# Patient Record
Sex: FEMALE | Race: WHITE | NOT HISPANIC OR LATINO
[De-identification: names, ages, dates, MRNs, and addresses within clinical notes are randomized per-mention and may not be internally consistent; named-entity substitution may affect disease eponyms.]

---

## 2019-07-16 ENCOUNTER — TRANSCRIPTION ENCOUNTER (OUTPATIENT)
Age: 59
End: 2019-07-16

## 2023-07-20 ENCOUNTER — INPATIENT (INPATIENT)
Facility: HOSPITAL | Age: 63
LOS: 0 days | Discharge: HOME | End: 2023-07-21
Attending: HOSPITALIST | Admitting: HOSPITALIST
Payer: COMMERCIAL

## 2023-07-20 VITALS
TEMPERATURE: 98 F | HEART RATE: 73 BPM | SYSTOLIC BLOOD PRESSURE: 160 MMHG | RESPIRATION RATE: 15 BRPM | DIASTOLIC BLOOD PRESSURE: 128 MMHG | OXYGEN SATURATION: 100 %

## 2023-07-20 DIAGNOSIS — Z98.890 OTHER SPECIFIED POSTPROCEDURAL STATES: Chronic | ICD-10-CM

## 2023-07-20 DIAGNOSIS — Z90.89 ACQUIRED ABSENCE OF OTHER ORGANS: Chronic | ICD-10-CM

## 2023-07-20 DIAGNOSIS — E03.9 HYPOTHYROIDISM, UNSPECIFIED: ICD-10-CM

## 2023-07-20 DIAGNOSIS — I63.9 CEREBRAL INFARCTION, UNSPECIFIED: ICD-10-CM

## 2023-07-20 DIAGNOSIS — R79.89 OTHER SPECIFIED ABNORMAL FINDINGS OF BLOOD CHEMISTRY: ICD-10-CM

## 2023-07-20 DIAGNOSIS — I48.20 CHRONIC ATRIAL FIBRILLATION, UNSPECIFIED: ICD-10-CM

## 2023-07-20 DIAGNOSIS — E87.20 ACIDOSIS, UNSPECIFIED: ICD-10-CM

## 2023-07-20 DIAGNOSIS — E78.5 HYPERLIPIDEMIA, UNSPECIFIED: ICD-10-CM

## 2023-07-20 LAB
ALBUMIN SERPL ELPH-MCNC: 4.3 G/DL — SIGNIFICANT CHANGE UP (ref 3.3–5)
ALP SERPL-CCNC: 123 U/L — HIGH (ref 40–120)
ALT FLD-CCNC: 31 U/L — SIGNIFICANT CHANGE UP (ref 4–33)
ANION GAP SERPL CALC-SCNC: 15 MMOL/L — HIGH (ref 7–14)
APTT BLD: 32.2 SEC — SIGNIFICANT CHANGE UP (ref 27–36.3)
AST SERPL-CCNC: 26 U/L — SIGNIFICANT CHANGE UP (ref 4–32)
BASOPHILS # BLD AUTO: 0.06 K/UL — SIGNIFICANT CHANGE UP (ref 0–0.2)
BASOPHILS NFR BLD AUTO: 0.6 % — SIGNIFICANT CHANGE UP (ref 0–2)
BILIRUB SERPL-MCNC: 0.8 MG/DL — SIGNIFICANT CHANGE UP (ref 0.2–1.2)
BUN SERPL-MCNC: 25 MG/DL — HIGH (ref 7–23)
CALCIUM SERPL-MCNC: 9.6 MG/DL — SIGNIFICANT CHANGE UP (ref 8.4–10.5)
CHLORIDE SERPL-SCNC: 103 MMOL/L — SIGNIFICANT CHANGE UP (ref 98–107)
CO2 SERPL-SCNC: 21 MMOL/L — LOW (ref 22–31)
CREAT SERPL-MCNC: 1.29 MG/DL — SIGNIFICANT CHANGE UP (ref 0.5–1.3)
EGFR: 47 ML/MIN/1.73M2 — LOW
EOSINOPHIL # BLD AUTO: 0.13 K/UL — SIGNIFICANT CHANGE UP (ref 0–0.5)
EOSINOPHIL NFR BLD AUTO: 1.2 % — SIGNIFICANT CHANGE UP (ref 0–6)
GLUCOSE SERPL-MCNC: 131 MG/DL — HIGH (ref 70–99)
HCT VFR BLD CALC: 36.8 % — SIGNIFICANT CHANGE UP (ref 34.5–45)
HGB BLD-MCNC: 11.9 G/DL — SIGNIFICANT CHANGE UP (ref 11.5–15.5)
IANC: 7.51 K/UL — HIGH (ref 1.8–7.4)
IMM GRANULOCYTES NFR BLD AUTO: 0.4 % — SIGNIFICANT CHANGE UP (ref 0–0.9)
INR BLD: 1.06 RATIO — SIGNIFICANT CHANGE UP (ref 0.88–1.16)
LYMPHOCYTES # BLD AUTO: 2.52 K/UL — SIGNIFICANT CHANGE UP (ref 1–3.3)
LYMPHOCYTES # BLD AUTO: 23.1 % — SIGNIFICANT CHANGE UP (ref 13–44)
MAGNESIUM SERPL-MCNC: 2.1 MG/DL — SIGNIFICANT CHANGE UP (ref 1.6–2.6)
MCHC RBC-ENTMCNC: 28.4 PG — SIGNIFICANT CHANGE UP (ref 27–34)
MCHC RBC-ENTMCNC: 32.3 GM/DL — SIGNIFICANT CHANGE UP (ref 32–36)
MCV RBC AUTO: 87.8 FL — SIGNIFICANT CHANGE UP (ref 80–100)
MONOCYTES # BLD AUTO: 0.64 K/UL — SIGNIFICANT CHANGE UP (ref 0–0.9)
MONOCYTES NFR BLD AUTO: 5.9 % — SIGNIFICANT CHANGE UP (ref 2–14)
NEUTROPHILS # BLD AUTO: 7.51 K/UL — HIGH (ref 1.8–7.4)
NEUTROPHILS NFR BLD AUTO: 68.8 % — SIGNIFICANT CHANGE UP (ref 43–77)
NRBC # BLD: 0 /100 WBCS — SIGNIFICANT CHANGE UP (ref 0–0)
NRBC # FLD: 0 K/UL — SIGNIFICANT CHANGE UP (ref 0–0)
PHOSPHATE SERPL-MCNC: 3.7 MG/DL — SIGNIFICANT CHANGE UP (ref 2.5–4.5)
PLATELET # BLD AUTO: 342 K/UL — SIGNIFICANT CHANGE UP (ref 150–400)
POTASSIUM SERPL-MCNC: 4.1 MMOL/L — SIGNIFICANT CHANGE UP (ref 3.5–5.3)
POTASSIUM SERPL-SCNC: 4.1 MMOL/L — SIGNIFICANT CHANGE UP (ref 3.5–5.3)
PROT SERPL-MCNC: 7.7 G/DL — SIGNIFICANT CHANGE UP (ref 6–8.3)
PROTHROM AB SERPL-ACNC: 12.3 SEC — SIGNIFICANT CHANGE UP (ref 10.5–13.4)
RBC # BLD: 4.19 M/UL — SIGNIFICANT CHANGE UP (ref 3.8–5.2)
RBC # FLD: 13.9 % — SIGNIFICANT CHANGE UP (ref 10.3–14.5)
SODIUM SERPL-SCNC: 139 MMOL/L — SIGNIFICANT CHANGE UP (ref 135–145)
TROPONIN T, HIGH SENSITIVITY RESULT: 9 NG/L — SIGNIFICANT CHANGE UP
WBC # BLD: 10.9 K/UL — HIGH (ref 3.8–10.5)
WBC # FLD AUTO: 10.9 K/UL — HIGH (ref 3.8–10.5)

## 2023-07-20 PROCEDURE — 93306 TTE W/DOPPLER COMPLETE: CPT | Mod: 26

## 2023-07-20 PROCEDURE — 99223 1ST HOSP IP/OBS HIGH 75: CPT

## 2023-07-20 PROCEDURE — 70450 CT HEAD/BRAIN W/O DYE: CPT | Mod: 26,59,MA

## 2023-07-20 PROCEDURE — G1004: CPT

## 2023-07-20 PROCEDURE — 70496 CT ANGIOGRAPHY HEAD: CPT | Mod: 26,MG

## 2023-07-20 PROCEDURE — 70498 CT ANGIOGRAPHY NECK: CPT | Mod: 26,MG

## 2023-07-20 RX ORDER — LEVOTHYROXINE SODIUM 125 MCG
100 TABLET ORAL DAILY
Refills: 0 | Status: DISCONTINUED | OUTPATIENT
Start: 2023-07-21 | End: 2023-07-21

## 2023-07-20 RX ORDER — METOPROLOL TARTRATE 50 MG
12.5 TABLET ORAL
Refills: 0 | Status: DISCONTINUED | OUTPATIENT
Start: 2023-07-20 | End: 2023-07-21

## 2023-07-20 RX ORDER — RIVAROXABAN 15 MG-20MG
15 KIT ORAL
Refills: 0 | Status: DISCONTINUED | OUTPATIENT
Start: 2023-07-21 | End: 2023-07-21

## 2023-07-20 RX ORDER — ATORVASTATIN CALCIUM 80 MG/1
80 TABLET, FILM COATED ORAL DAILY
Refills: 0 | Status: DISCONTINUED | OUTPATIENT
Start: 2023-07-20 | End: 2023-07-20

## 2023-07-20 RX ORDER — LEVOTHYROXINE SODIUM 125 MCG
1 TABLET ORAL
Refills: 0 | DISCHARGE

## 2023-07-20 RX ADMIN — Medication 12.5 MILLIGRAM(S): at 19:17

## 2023-07-20 NOTE — H&P ADULT - PROBLEM SELECTOR PLAN 2
-Currently in SR.  -Will start low dose metoprolol 12.5 BID.  -Started on Xarelto.  -Counseled pt on standing metoprolol. Aware of her concerns w/ adverse effect, which primarily is w/ weight gain and bloating of mid body. For now - will try lower dose of BB.  -Monitor telemetry.  -Check TSH (reportedly normal a few days ago).  -Further f/u with PCP/Cardiology (Dr Breezy Wright).

## 2023-07-20 NOTE — CONSULT NOTE ADULT - SUBJECTIVE AND OBJECTIVE BOX
HPI:  63 y.o. F with PMH of paroxysmal afib (diagnosed in 2018, on metoprolol prn) and hypothyroidism presented to [facility] ED due to R sided numbness. Neurology consulted for concern for stroke and abnormal CTH finding. LKN 12:00 hr on 7/20/23. Pt had gone to 's brother's party, came back around midnight at her usual state of health. Pt made a tea then around 12:30 hr on 7/20, started to feel R forehead, temporal region of R side of head numbness, radiating down behind the ear and shoulders. At that time, took baby aspirin then presented to ED. There was no associated symptoms of visual deficit, numbness/tingling/weakness elsewhere in the body, gait instability, speech difficulty, or altered mental state. It lasted for about 1-1.5 hrs and while awaiting to come into the ED, symptom resolved. Currently denies any headache, chest pain, abdominal pain, numbness/tingling/weakness throughout extremities. For the past 10 days, patient has been having atrial fibrillation that sustained longer than her usual 18-24 hr. Took metoprolol every other day per her cardiologist, whom she visited during the episode and scheduled her for echocardiogram on this upcoming Friday. At this time, pt feels back to her baseline state. Denies any smoking, or illicit drug use or marijuana use. Very rare use of alcohol maybe once a month maybe. Pt is independent with ADLs and works at a highly stressful environment. There had been alot going on in her work for several years, but unclear trigger that might have led to the presenting symptom. Lives with her . Reported that she was initially on Eliquis in 2018 when she first diagnosed with afib. However, it made her flush so stopped taking. Instead was recommended to use aspirin whenever afib came on as symptoms were not sustained, but rare maybe once a year. So it was unusual that she had 10 day of atrial fibrillation this time. Even aspirin makes her to get bruises easily so she does not take it daily. Never taken plavix, or other anticoagulations such as lovenox, xarelto. Never had prior hx of stroke, seizure, demyleinating diseases such as MS or transverse myelitis. Never felt L sided sensation or weakness symptoms.     (Stroke only)  NIHSS: 0   MRS: 0    REVIEW OF SYSTEMS  A 10-system ROS was performed and is negative except for those items noted above and/or in the HPI.    PAST MEDICAL & SURGICAL HISTORY:  Hypothyroidism      PAF (paroxysmal atrial fibrillation)        FAMILY HISTORY:    SOCIAL HISTORY: as noted in HPI    MEDICATIONS (HOME):  Home Medications:    MEDICATIONS  (STANDING):    MEDICATIONS  (PRN):    ALLERGIES/INTOLERANCES:  Allergies  penicillin (Unknown)    Intolerances    VITALS & EXAMINATION:  Vital Signs Last 24 Hrs  T(C): 36.7 (20 Jul 2023 02:42), Max: 36.8 (20 Jul 2023 01:09)  T(F): 98 (20 Jul 2023 02:42), Max: 98.2 (20 Jul 2023 01:09)  HR: 64 (20 Jul 2023 02:42) (64 - 73)  BP: 125/63 (20 Jul 2023 02:42) (125/63 - 160/128)  BP(mean): --  RR: 17 (20 Jul 2023 02:42) (15 - 17)  SpO2: 100% (20 Jul 2023 02:42) (100% - 100%)    Parameters below as of 20 Jul 2023 02:42  Patient On (Oxygen Delivery Method): room air        General:  Constitutional: Obese Female, appears stated age, in no apparent distress including pain  Head: Normocephalic & atraumatic.  Extremities: R elbow bruises    Neurological (>12):  MS: Eyes open. Maintains eye opening to verbal stimuli. Awake, alert, oriented to person, place, situation, time. Normal affect. Follows all commands. Speech is clear, fluent with good repetition & comprehension (able to name objects socks, alcohol pad, hand)    CNs: VFF. EOMI no nystagmus, no diplopia. V1-3 intact to LT, well developed masseter muscles b/l. No facial asymmetry b/l, full eye closure strength b/l. Hearing grossly normal (rubbing fingers) b/l. Symmetric palate elevation in midline. Head turning & shoulder shrug intact b/l. Tongue midline, normal movements, no atrophy.    Motor: Normal muscle bulk & tone. No noticeable tremor. No pronator drift.              Deltoid	Biceps	Triceps	   R	5	5	5	5	  L	5	5	5	5	  	H-Flex	K-Flex	K-Ext	D-Flex	P-Flex  R	5	5	5	5	5		   L	5	5	5	5	5		     Sensation: Intact to LT b/l throughout.     Cortical: Extinction on DSS (neglect): none    Reflexes:              Biceps(C5)       BR(C6)     Triceps(C7)               Patellar(L4)    Achilles(S1)    Plantar Resp  R	2	          2	             2		        1		    2		Down   L	2	          2	             2		        1		    2		Down     Coordination: intact rapid-alt movements. No dysmetria to FTN    Gait: Deferred    LABORATORY:  CBC                       11.9   10.90 )-----------( 342      ( 20 Jul 2023 02:50 )             36.8     Chem 07-20    139  |  103  |  25<H>  ----------------------------<  131<H>  4.1   |  21<L>  |  1.29    Ca    9.6      20 Jul 2023 02:50  Phos  3.7     07-20  Mg     2.10     07-20    TPro  7.7  /  Alb  4.3  /  TBili  0.8  /  DBili  x   /  AST  26  /  ALT  31  /  AlkPhos  123<H>  07-20    LFTs LIVER FUNCTIONS - ( 20 Jul 2023 02:50 )  Alb: 4.3 g/dL / Pro: 7.7 g/dL / ALK PHOS: 123 U/L / ALT: 31 U/L / AST: 26 U/L / GGT: x           Coagulopathy PT/INR - ( 20 Jul 2023 02:50 )   PT: 12.3 sec;   INR: 1.06 ratio         PTT - ( 20 Jul 2023 02:50 )  PTT:32.2 sec  Lipid Panel   A1c   Cardiac enzymes     U/A Urinalysis Basic - ( 20 Jul 2023 02:50 )    Color: x / Appearance: x / SG: x / pH: x  Gluc: 131 mg/dL / Ketone: x  / Bili: x / Urobili: x   Blood: x / Protein: x / Nitrite: x   Leuk Esterase: x / RBC: x / WBC x   Sq Epi: x / Non Sq Epi: x / Bacteria: x      CSF  Immunological  Other    STUDIES & IMAGING:  Studies (EKG, EEG, EMG, etc):     Radiology (XR, CT, MR, U/S, TTE/JOSUE):   HPI:  63 y.o. F with PMH of paroxysmal afib (diagnosed in 2018, on metoprolol prn) and hypothyroidism presented to [facility] ED due to R sided numbness. Neurology consulted for concern for stroke and abnormal CTH finding. LKN 12:00 hr on 7/20/23. Pt had gone to 's brother's party, came back around midnight at her usual state of health. Pt made a tea then around 12:30 hr on 7/20, started to feel R forehead, temporal region of R side of head numbness, radiating down behind the ear and shoulders. At that time, took baby aspirin then presented to ED. There was no associated symptoms of visual deficit, numbness/tingling/weakness elsewhere in the body, gait instability, speech difficulty, or altered mental state. It lasted for about 1-1.5 hrs and while awaiting to come into the ED, symptom resolved. Currently denies any headache, chest pain, abdominal pain, numbness/tingling/weakness throughout extremities. For the past 10 days, patient has been having atrial fibrillation that sustained longer than her usual 18-24 hr. Took metoprolol every other day per her cardiologist, whom she visited during the episode and scheduled her for echocardiogram on this upcoming Friday. At this time, pt feels back to her baseline state. Denies any smoking, or illicit drug use or marijuana use. Very rare use of alcohol maybe once a month maybe. Pt is independent with ADLs and works at a highly stressful environment. There had been alot going on in her work for several years, but unclear trigger that might have led to the presenting symptom. Lives with her . Reported that she was initially on Eliquis in 2018 when she first diagnosed with afib. However, it made her flush so stopped taking. Instead was recommended to use aspirin whenever afib came on as symptoms were not sustained, but rare maybe once a year. So it was unusual that she had 10 day of atrial fibrillation this time. Even aspirin makes her to get bruises easily so she does not take it daily. Never taken plavix, or other anticoagulations such as lovenox, xarelto. Never had prior hx of stroke, seizure, demyleinating diseases such as MS or transverse myelitis. Never felt L sided sensation or weakness symptoms.     (Stroke only)  NIHSS: 0   MRS: 0    REVIEW OF SYSTEMS  A 10-system ROS was performed and is negative except for those items noted above and/or in the HPI.    PAST MEDICAL & SURGICAL HISTORY:  Hypothyroidism      PAF (paroxysmal atrial fibrillation)        FAMILY HISTORY:    SOCIAL HISTORY: as noted in HPI    MEDICATIONS (HOME):  Home Medications:    MEDICATIONS  (STANDING):    MEDICATIONS  (PRN):    ALLERGIES/INTOLERANCES:  Allergies  penicillin (Unknown)    Intolerances    VITALS & EXAMINATION:  Vital Signs Last 24 Hrs  T(C): 36.7 (20 Jul 2023 02:42), Max: 36.8 (20 Jul 2023 01:09)  T(F): 98 (20 Jul 2023 02:42), Max: 98.2 (20 Jul 2023 01:09)  HR: 64 (20 Jul 2023 02:42) (64 - 73)  BP: 125/63 (20 Jul 2023 02:42) (125/63 - 160/128)  BP(mean): --  RR: 17 (20 Jul 2023 02:42) (15 - 17)  SpO2: 100% (20 Jul 2023 02:42) (100% - 100%)    Parameters below as of 20 Jul 2023 02:42  Patient On (Oxygen Delivery Method): room air        General:  Constitutional: Obese Female, appears stated age, in no apparent distress including pain  Head: Normocephalic & atraumatic.  Extremities: R elbow bruises    Neurological (>12):  MS: Eyes open. Maintains eye opening to verbal stimuli. Awake, alert, oriented to person, place, situation, time. Normal affect. Follows all commands. Speech is clear, fluent with good repetition & comprehension (able to name objects socks, alcohol pad, hand)    CNs: VFF. EOMI no nystagmus, no diplopia. V1-3 intact to LT, well developed masseter muscles b/l. No facial asymmetry b/l, full eye closure strength b/l. Hearing grossly normal (rubbing fingers) b/l. Symmetric palate elevation in midline. Head turning & shoulder shrug intact b/l. Tongue midline, normal movements, no atrophy.    Motor: Normal muscle bulk & tone. No noticeable tremor. No pronator drift.              Deltoid	Biceps	Triceps	   R	5	5	5	5	  L	5	5	5	5	  	H-Flex	K-Flex	K-Ext	D-Flex	P-Flex  R	5	5	5	5	5		   L	5	5	5	5	5		     Sensation: Intact to LT b/l throughout.     Cortical: Extinction on DSS (neglect): none    Reflexes:              Biceps(C5)       BR(C6)     Triceps(C7)               Patellar(L4)    Achilles(S1)    Plantar Resp  R	2	          2	             2		        1		    2		Down   L	2	          2	             2		        1		    2		Down     Coordination: intact rapid-alt movements. No dysmetria to FTN    Gait: Deferred    LABORATORY:  CBC                       11.9   10.90 )-----------( 342      ( 20 Jul 2023 02:50 )             36.8     Chem 07-20    139  |  103  |  25<H>  ----------------------------<  131<H>  4.1   |  21<L>  |  1.29    Ca    9.6      20 Jul 2023 02:50  Phos  3.7     07-20  Mg     2.10     07-20    TPro  7.7  /  Alb  4.3  /  TBili  0.8  /  DBili  x   /  AST  26  /  ALT  31  /  AlkPhos  123<H>  07-20    LFTs LIVER FUNCTIONS - ( 20 Jul 2023 02:50 )  Alb: 4.3 g/dL / Pro: 7.7 g/dL / ALK PHOS: 123 U/L / ALT: 31 U/L / AST: 26 U/L / GGT: x           Coagulopathy PT/INR - ( 20 Jul 2023 02:50 )   PT: 12.3 sec;   INR: 1.06 ratio         PTT - ( 20 Jul 2023 02:50 )  PTT:32.2 sec  Lipid Panel   A1c   Cardiac enzymes     U/A Urinalysis Basic - ( 20 Jul 2023 02:50 )    Color: x / Appearance: x / SG: x / pH: x  Gluc: 131 mg/dL / Ketone: x  / Bili: x / Urobili: x   Blood: x / Protein: x / Nitrite: x   Leuk Esterase: x / RBC: x / WBC x   Sq Epi: x / Non Sq Epi: x / Bacteria: x      CSF  Immunological  Other    STUDIES & IMAGING:  Studies (EKG, EEG, EMG, etc):     Radiology (XR, CT, MR, U/S, TTE/JOSUE):  < from: CT Head No Cont (07.20.23 @ 03:08) >  IMPRESSION:    Findings concerning for acute/subacute infarct within the right frontal   lobe, with subtle area of relative cortical hyperattenuation which may   represent petechial hemorrhage. MRI brain recommended for further   characterization.    No mass effect.    < end of copied text >

## 2023-07-20 NOTE — ED CDU PROVIDER INITIAL DAY NOTE - ATTENDING APP SHARED VISIT CONTRIBUTION OF CARE
The decision was made to admit this patient to the CDU as documented in my Provider note I have reviewed the note  written by the CDU Physician Assistant, on that visit day. I have supervised and participated as necessary in the performance of procedures indicated for patient management and was available at all phases of the patient´s visit when needed.    Please see the  provider note for the details of the decision to admit  Vital Signs Last 24 Hrs  T(F): 97.9 HR: 65: 131/62 RR: 17 SpO2: 96% (20 Jul 2023 06:49) (96% - 100%)    PE unchanged at time of admission  Patient admitted for CT that showed acute/subacute infarct within the right frontal   lobe, with subtle area of relative cortical hyperattenuation which may   represent petechial hemorrhage in patient who is non compliant with AC  Pt to be seen by neuro;   CTA head and neck ordered en lieu of neuro to see to d/w  MRI further workup ECHO performed   to reassess

## 2023-07-20 NOTE — OCCUPATIONAL THERAPY INITIAL EVALUATION ADULT - PERTINENT HX OF CURRENT PROBLEM, REHAB EVAL
63 year old female presenting with right forehead and temporal region head numbness, radiating down behind the ear and shoulders. Symptoms lasted for about 1-1.5 hrs and while awaiting to come into the ED, symptom resolved. CT head significant for acute/subacute infarct within the right frontal lobe, with subtle area of relative cortical hyperattenuation which may represent petechial hemorrhage.

## 2023-07-20 NOTE — PATIENT PROFILE ADULT - FALL HARM RISK - HARM RISK INTERVENTIONS

## 2023-07-20 NOTE — ED ADULT NURSE NOTE - NSFALLUNIVINTERV_ED_ALL_ED
Bed/Stretcher in lowest position, wheels locked, appropriate side rails in place/Call bell, personal items and telephone in reach/Instruct patient to call for assistance before getting out of bed/chair/stretcher/Non-slip footwear applied when patient is off stretcher/[place] to call system/Physically safe environment - no spills, clutter or unnecessary equipment/Purposeful proactive rounding/Room/bathroom lighting operational, light cord in reach

## 2023-07-20 NOTE — ED ADULT NURSE REASSESSMENT NOTE - GENERAL PATIENT STATE
Pt is axo4, calm pleasant affect presently denies any Neuro deficits as per report Pt reported rt sided facial numbness/extending to rt shoulder. hx of afib. Pt presently NSR. vs and neuro check q 4 hours see flow sheet charting./comfortable appearance/family/SO at bedside

## 2023-07-20 NOTE — CONSULT NOTE ADULT - ATTENDING COMMENTS
seen 7/20 AM  Karine   63 y.o. obese  F with known paroxysmal afib (diagnosed in 2018, on metoprolol prn; previoulsy on eliquis but said she had flushing) and hypothyroidism presented to [facility] ED due to R sided numbness. Neurology consulted for concern for stroke and abnormal CTH finding. LKN 12:00 hr on 7/20/23. Had 1-1.5 hrs of R V1 region, R temporal region, and R shoulder numbness that resolved at this time. Had been seeing cardiologist recently due to 10 day of sustained afib, which reverted back to sinus with metoprolol use. Had adverse reaction of flushing with eliquis back in 2018, so does not take eliquis. Advised to take aspirin when needed because her afib usually lasts only 18-24 hrs, once a year. Aspirin also makes her bruise easily. Neuro exam is non-focal.   CTH showing acute/subacute infarct in R frontal lobe, with subtle area os hyperattenuation which may represent petechial hemorrhage.   NIHSS: 0   MRS: 0  A1c 117  LDL 5.4  TTE done 7/20 f/u read     Impression: R NORMAN stroke likely CE from AF     Recommendations:   - admit for further workup  - cardio eval for AF   - Start DOAC.  prefer eliquis 5mg BID but since had AE can consider xarelto or pradaxa.   - would start DOAC now.  repeat CTH in 24hrs to moniotr for hemorrhage.   - MRI brain, refusing   - High dose statin therapy - atorvastatin 80mg PO daily. LDL goal <70mg/dL.  - CTA H/N f/u read   - f/u TTE   - telemetry  - BP normotesnive   - check FS, glucose control <180  - GI/DVT ppx  - Counseling on diet, exercise, and medication adherence was done  - Counseling on smoking cessation and alcohol consumption offered when appropriate.  - Pain assessed and judicious use of narcotics when appropriate was discussed.    - Stroke education given when appropriate.  - Importance of fall prevention discussed.   - Differential diagnosis and plan of care discussed with patient and/or family and primary team  - Thank you for allowing me to participate in the care of this patient. Call with questions.    - Upon discharge, PT should F/U with Dr. Catalan: [phone] [address] [place] [address]     Dalton Catalan MD  Vascular Neurology  Office: [phone]

## 2023-07-20 NOTE — ED CDU PROVIDER INITIAL DAY NOTE - CLINICAL SUMMARY MEDICAL DECISION MAKING FREE TEXT BOX
63-year-old female past medical history of paroxysmal A-fib and hypothyroidism presenting with right-sided numbness. HD stable. Labs reviewed. CT head shows "Findings concerning for acute/subacute infarct within the right frontal lobe, with subtle area of relative cortical hyperattenuation which may represent petechial hemorrhage. MRI brain recommended for further characterization." Pt is refusing MRI brain. Seen by Neuro, recommend admission for further work up, and consideration of anticoagulation.

## 2023-07-20 NOTE — CONSULT NOTE ADULT - ASSESSMENT
63 y.o. F with PMH of paroxysmal afib (diagnosed in 2018, on metoprolol prn) and hypothyroidism presented to [facility] ED due to R sided numbness. Neurology consulted for concern for stroke and abnormal CTH finding. LKN 12:00 hr on 7/20/23. Had 1-1.5 hrs of R V1 region, R temporal region, and R shoulder numbness that resolved at this time. Had been seeing cardiologist recently due to 10 day of sustained afib, which reverted back to sinus with metoprolol use. Had adverse reaction of flushing with eliquis back in 2018, so does not take eliquis. Advised to take aspirin when needed because her afib usually lasts only 18-24 hrs, once a year. Aspirin also makes her bruise easily. Neuro exam is non-focal. CTH awaiting final read but could be subacute to chronic R frontal infarct, NORMAN territory, which has unclear clinical correlation with presenting symptom.     Impression: R v1, R temporal region, and R shoulder decreased sensation of unclear etiology. Localization could be L hemispheric dysfunction. Ddx include acute ischemic stroke (especially given no anticoagulation use, scarce aspirin use, and recent sustained afib for 10 days) vs TIA vs r/o toxic metabolic or infectious. There is subacute to chronic R NORMAN infarct on CTH done on 7/20, for which mechanism is unclear and considering cardioembolism    Recommendations:  [] Await CTH result  [] Advised patient to stay at least overnight in the hospital for further stroke work up with MRI brain w/o and TTE with bubble. However, at this time, patient does not want to risk of having abnormal heart rhythm with closed MRI in the hospital as it will make her nervous. Pt declined the inpatient or CDU evaluation at this time. If pt changes mind about getting work up, can consider CDU for MRI brain w/o and MRA H/N  [] Ideally, patient should be taking Eliquis as secondary prevention of stroke given history of eliquis. However, due to adverse effects of flushing in the past and "harsh on body" pt is not taking at this time. Recommended for dual antiplatelet at least with aspirin and plavix however aspirin makes her bruise easily. Unsure if she will take both, but in the end pt agreed to take aspirin daily at least until expedited neurology follow up  [] Explained risks and benefit of leaving the hospital without stroke work up given prior infarct on imaging and what happened today. Pt still declined in hospital work up and wishes to get open MRI. Advised patient to return to the emergency room in case for returning arrhythmia and/or any one sided deficit with sensation, weakness, balance, or visual deficits. Pt voiced understanding  [] Atorvastatin 80, titrate to LDL<70  [] A1c and Lipid profile  [] Telemonitoring;  Neurochecks and Vital signs q4 if patient changes mind  [] Permissive HTN up to 220/120 mmHg for first 24 hours after the symptom onset followed by gradual normotension.   [] BGM goals 140-180  [] PT/OT   [] NPO until clears Dysphagia screen, otherwise Swallow evaluation  [] Stroke education provided  [] Upon discharge, PT should F/U with Dr. Catalan: [phone] [address] [place] [address]     Case to be seen and discussed with stroke attending in AM if remains in hospital 63 y.o. F with PMH of paroxysmal afib (diagnosed in 2018, on metoprolol prn) and hypothyroidism presented to [facility] ED due to R sided numbness. Neurology consulted for concern for stroke and abnormal CTH finding. LKN 12:00 hr on 7/20/23. Had 1-1.5 hrs of R V1 region, R temporal region, and R shoulder numbness that resolved at this time. Had been seeing cardiologist recently due to 10 day of sustained afib, which reverted back to sinus with metoprolol use. Had adverse reaction of flushing with eliquis back in 2018, so does not take eliquis. Advised to take aspirin when needed because her afib usually lasts only 18-24 hrs, once a year. Aspirin also makes her bruise easily. Neuro exam is non-focal. CTH showing acute/subacute infarct in R frontal lobe, with subtle area os hyperattenuation which may represent petechial hemorrhage.     Impression: R v1, R temporal region, and R shoulder decreased sensation of unclear etiology. Localization could be L hemispheric dysfunction. Ddx include acute ischemic stroke (especially given no anticoagulation use, scarce aspirin use, and recent sustained afib for 10 days) vs TIA vs r/o toxic metabolic or infectious. There is subacute to chronic R NORMAN infarct on CTH done on 7/20, for which mechanism is unclear and considering cardioembolism    Recommendations:  [] Advised patient to stay at least overnight in the hospital for further stroke work up with MRI brain w/o and TTE with bubble. However, at this time, patient does not want to risk of having abnormal heart rhythm with closed MRI in the hospital as it will make her nervous. Pt declined the inpatient or CDU evaluation at this time. If pt changes mind about getting work up, can consider CDU for MRI brain w/o and MRA H/N  [] Ideally, patient should be taking Eliquis as secondary prevention of stroke given history of eliquis. However, due to adverse effects of flushing in the past and "harsh on body" pt is not taking at this time. Recommended for dual antiplatelet at least with aspirin and plavix however aspirin makes her bruise easily. Unsure if she will take both, but in the end pt agreed to take aspirin daily at least until expedited neurology follow up  [] Explained risks and benefit of leaving the hospital without stroke work up given prior infarct on imaging and what happened today. Pt still declined in hospital work up and wishes to get open MRI. Advised patient to return to the emergency room in case for returning arrhythmia and/or any one sided deficit with sensation, weakness, balance, or visual deficits. Pt voiced understanding  [] Atorvastatin 80, titrate to LDL<70  [] A1c and Lipid profile  [] Telemonitoring;  Neurochecks and Vital signs q4 if patient changes mind  [] Permissive HTN up to 220/120 mmHg for first 24 hours after the symptom onset followed by gradual normotension.   [] BGM goals 140-180  [] PT/OT   [] NPO until clears Dysphagia screen, otherwise Swallow evaluation  [] Stroke education provided  [] Upon discharge, PT should F/U with Dr. Catalan: [phone] [address] [facility] [place] [place] [address]     Case to be seen and discussed with stroke attending in AM if remains in hospital 63 y.o. F with PMH of paroxysmal afib (diagnosed in 2018, on metoprolol prn) and hypothyroidism presented to [facility] ED due to R sided numbness. Neurology consulted for concern for stroke and abnormal CTH finding. LKN 12:00 hr on 7/20/23. Had 1-1.5 hrs of R V1 region, R temporal region, and R shoulder numbness that resolved at this time. Had been seeing cardiologist recently due to 10 day of sustained afib, which reverted back to sinus with metoprolol use. Had adverse reaction of flushing with eliquis back in 2018, so does not take eliquis. Advised to take aspirin when needed because her afib usually lasts only 18-24 hrs, once a year. Aspirin also makes her bruise easily. Neuro exam is non-focal. CTH showing acute/subacute infarct in R frontal lobe, with subtle area os hyperattenuation which may represent petechial hemorrhage.     Impression: R v1, R temporal region, and R shoulder decreased sensation of unclear etiology. Localization could be L hemispheric dysfunction. Ddx include acute ischemic stroke (especially given no anticoagulation use, scarce aspirin use, and recent sustained afib for 10 days) vs TIA vs r/o toxic metabolic or infectious. There is acute to subacute R NORMAN infarct on CTH done on 7/20, for which mechanism is unclear and considering cardioembolism    Recommendations:  [] CT scan result was communicated to the patient. She is now agreeable to stay in CDU at least for echocardiogram. Still resistant to close MR due to concern for re-developing afib due to stress and anxiety in the MR machine. If she changes her mind and wishes to pursue work up in CDU, can do MRI brain w/o and MR H/N wo/w. Otherwise would advise at least CTA H/N w/ IV contrast   [] Ideally, patient should be taking Eliquis as secondary prevention of stroke given history of eliquis. However, due to adverse effects of flushing in the past and "harsh on body" pt is not taking at this time. Given petechial hemorrhage, will confirm with stroke attending in AM for final recommendations for antiplatelet or anticoagulation management  [] Atorvastatin 80, titrate to LDL<70  [] A1c and Lipid profile  [] Telemonitoring;  Neurochecks and Vital signs q4 if patient changes mind  [] Permissive HTN up to 220/120 mmHg for first 24 hours after the symptom onset followed by gradual normotension.   [] BGM goals 140-180  [] PT/OT   [] NPO until clears Dysphagia screen, otherwise Swallow evaluation  [] Stroke education provided  [] Upon discharge, PT should F/U with Dr. Catalan: [phone] [address] [place] [address]     Case to be seen and discussed with stroke attending in AM 63 y.o. F with PMH of paroxysmal afib (diagnosed in 2018, on metoprolol prn) and hypothyroidism presented to [facility] ED due to R sided numbness. Neurology consulted for concern for stroke and abnormal CTH finding. LKN 12:00 hr on 7/20/23. Had 1-1.5 hrs of R V1 region, R temporal region, and R shoulder numbness that resolved at this time. Had been seeing cardiologist recently due to 10 day of sustained afib, which reverted back to sinus with metoprolol use. Had adverse reaction of flushing with eliquis back in 2018, so does not take eliquis. Advised to take aspirin when needed because her afib usually lasts only 18-24 hrs, once a year. Aspirin also makes her bruise easily. Neuro exam is non-focal. CTH showing acute/subacute infarct in R frontal lobe, with subtle area os hyperattenuation which may represent petechial hemorrhage.     Impression: R v1, R temporal region, and R shoulder decreased sensation of unclear etiology. Localization could be L hemispheric dysfunction. Ddx include acute ischemic stroke (especially given no anticoagulation use, scarce aspirin use, and recent sustained afib for 10 days) vs TIA vs r/o toxic metabolic or infectious. There is acute to subacute R NORMAN infarct on CTH done on 7/20, for which mechanism is unclear and considering cardioembolism    Recommendations:  [] CT scan result was communicated to the patient. She is now agreeable to stay in CDU at least for echocardiogram. Still resistant to close MR due to concern for re-developing afib due to stress and anxiety in the MR machine. If she changes her mind and wishes to pursue work up in CDU, can do MRI brain w/o and MR H/N wo/w. Otherwise would advise at least CTA H/N w/ IV contrast   [] TTE with bubble  [] Ideally, patient should be taking Eliquis as secondary prevention of stroke given history of eliquis. However, due to adverse effects of flushing in the past and "harsh on body" pt is not taking at this time. Given petechial hemorrhage, will confirm with stroke attending in AM for final recommendations for antiplatelet or anticoagulation management  [] Atorvastatin 80, titrate to LDL<70  [] A1c and Lipid profile  [] Telemonitoring;  Neurochecks and Vital signs q4 if patient changes mind  [] Permissive HTN up to 220/120 mmHg for first 24 hours after the symptom onset followed by gradual normotension.   [] BGM goals 140-180  [] PT/OT   [] NPO until clears Dysphagia screen, otherwise Swallow evaluation  [] Stroke education provided  [] Upon discharge, PT should F/U with Dr. Catalan: [phone] [address] [facility] [address] [place] [address]     Case to be seen and discussed with stroke attending in AM

## 2023-07-20 NOTE — H&P ADULT - NSHPPHYSICALEXAM_GEN_ALL_CORE
Vital Signs Last 24 Hrs  T(C): 36.9 (20 Jul 2023 10:45), Max: 36.9 (20 Jul 2023 10:45)  T(F): 98.4 (20 Jul 2023 10:45), Max: 98.4 (20 Jul 2023 10:45)  HR: 65 (20 Jul 2023 10:45) (64 - 73)  BP: 126/60 (20 Jul 2023 10:45) (125/63 - 160/128)  BP(mean): --  RR: 16 (20 Jul 2023 10:45) (15 - 18)  SpO2: 96% (20 Jul 2023 10:45) (96% - 100%)    Parameters below as of 20 Jul 2023 10:45  Patient On (Oxygen Delivery Method): room air      Gen- In bed, comfortable, NAD  Eyes- EOMI, PERRLA, nonicteric.  EMNT- Fair dentition. MMM. No tonsilar exudates. No posterior pharynx erythema.  Neck- Supple. No masses. No tracheal deviation.  Resp- CTAB, good effort. No r/r/w. No accessory muscle use.  CVS- RRR, S1S2, no g/r/m. No LE edema.  GI- Soft obese abd, NT, ND, +BSx4. No HSM.  MSK- No C/C. ROM intact. No crepitus.  Neuro- CN II-XII intact. Speech fluent/face symmetric. Sensation intact.  Skin- No rashes/ulcers. Warm.  Psych- AAOx3. Appropriate mood/affect.

## 2023-07-20 NOTE — PHYSICAL THERAPY INITIAL EVALUATION ADULT - NSPTDISCHREC_GEN_A_CORE
to address impairments in dynamic balance. Pt to be removed from PT program as patient independent./Outpatient PT

## 2023-07-20 NOTE — ED ADULT TRIAGE NOTE - TEMPERATURE IN CELSIUS (DEGREES C)
Instructed on Lexiscan Stress Test Procedure including possible side effects/ adverse reactions. Patient verbalizes  understanding and denies having any questions . See [address] [place] [address] Cardiology 36.8

## 2023-07-20 NOTE — H&P ADULT - PROBLEM SELECTOR PLAN 1
-Neuro deficits noted on admission. Imaging revealed stroke.  -NIHSS 0  -ECHO w/ bubble unremarkable. NO PFO.  -CTA H/N - no acute findings. CTH w/ "acute/subacute infarct within the right frontal lobe, with subtle area of relative cortical hyperattenuation which may represent petechial hemorrhage."  -Neuro recs appreciated-->  -MRI refused. Neuro rec repeat CTH in 24h to monitor for hemorrhage.  -Ok to start DOAC -> will try Xarelto.  -Normotension ok.  -PT/OT eval. Dysphagia screen.   -Lipid panel elevated. A1c 5.4  -Telemetry monitoring.

## 2023-07-20 NOTE — ED PROVIDER NOTE - ATTENDING CONTRIBUTION TO CARE
I performed a face-to-face evaluation of the patient and performed a history and physical examination along with the resident or ACP, and/or medical student above.  I agree with the history and physical examination as documented by the resident or ACP, and/or medical student above.  Ayoub:  CONSTITUTIONAL: Well appearing, awake, alert, oriented to person, place, time/situation and in no apparent distress.  HEAD: Atraumatic  EYES: Clear bilaterally, pupils equal, round and reactive to light.  ENMT: Airway patent, Nasal mucosa clear. Mouth with normal mucosa. Uvula is midline.   CARDIAC: Normal rate, regular rhythm. +S1/S2. No murmurs, rubs or gallops.  RESPIRATORY: Breathing unlabored. Breath sounds clear and equal bilaterally.  ABDOMEN: Soft, nontender, nondistended. No rebound tenderness or guarding.  NEUROLOGICAL: Alert and oriented, no focal deficits, no motor or sensory deficits. CN2-12 intact. Sensation intact x4 extremities.  SKIN: Skin warm and dry. No evidence of rashes or lesions.

## 2023-07-20 NOTE — OCCUPATIONAL THERAPY INITIAL EVALUATION ADULT - DIAGNOSIS, OT EVAL
s/p right sided numbness, s/p right frontal lobe infarct; decreased functional mobility, decreased ADL performance

## 2023-07-20 NOTE — ED CDU PROVIDER DISPOSITION NOTE - ATTENDING CONTRIBUTION TO CARE
I have made the decision to admit this patient to the CDU as documented in the Provider note I have reviewed the note  written by the CDU Physician Assistant, on that visit day. I have supervised and participated as necessary in the performance of procedures indicated for patient management and was available at all phases of the patient´s visit when needed.    Patient seen by neurology recommending admission for further workup

## 2023-07-20 NOTE — ED ADULT NURSE NOTE - CHIEF COMPLAINT QUOTE
presents C/O numbness to right side of face. x20 min. now resolved. No facial droop or slurred speech noted. Pt has equal strength, motor, and sensation to bilateral upper and lower extremities. No drifts noted to bilateral upper and lower extremities phx Afib hypothyroid.

## 2023-07-20 NOTE — ED PROVIDER NOTE - CLINICAL SUMMARY MEDICAL DECISION MAKING FREE TEXT BOX
63-year-old female past medical history of paroxysmal A-fib and hypothyroidism presenting with right-sided numbness- began 1.5h pta and resolved shortly after arrival. VS not actionable. Exam without FND, with normal regular heart sounds. plan to obtain ekg cbc cmp trop coags

## 2023-07-20 NOTE — PHYSICAL THERAPY INITIAL EVALUATION ADULT - PERTINENT HX OF CURRENT PROBLEM, REHAB EVAL
Pt is a 63 year old female presenting with R forehead and temporal region head numbness, radiating down behind the ear and shoulders. Symptoms lasted for about 1-1.5 hrs and while awaiting to come into the ED, symptom resolved. CT head significant for acute/subacute infarct within the right frontal lobe, with subtle area of relative cortical hyperattenuation which may represent petechial hemorrhage.

## 2023-07-20 NOTE — ED ADULT TRIAGE NOTE - CHIEF COMPLAINT QUOTE
presents C/O numbness to right side of face. x20 min. No facial droop or slurred speech noted. Pt has equal strength, motor, and sensation to bilateral upper and lower extremities. No drifts noted to bilateral upper and lower extremities phx Afib hypothyroid. presents C/O numbness to right side of face. x20 min. now resolved. No facial droop or slurred speech noted. Pt has equal strength, motor, and sensation to bilateral upper and lower extremities. No drifts noted to bilateral upper and lower extremities phx Afib hypothyroid.

## 2023-07-20 NOTE — H&P ADULT - NSHPLABSRESULTS_GEN_ALL_CORE
11.9   10.90 )-----------( 342      ( 20 Jul 2023 02:50 )             36.8     07-20    139  |  103  |  25<H>  ----------------------------<  131<H>  4.1   |  21<L>  |  1.29    Ca    9.6      20 Jul 2023 02:50  Phos  3.7     07-20  Mg     2.10     07-20    TPro  7.7  /  Alb  4.3  /  TBili  0.8  /  DBili  x   /  AST  26  /  ALT  31  /  AlkPhos  123<H>  07-20    PT/INR - ( 20 Jul 2023 02:50 )   PT: 12.3 sec;   INR: 1.06 ratio         PTT - ( 20 Jul 2023 02:50 )  PTT:32.2 sec  Urinalysis Basic - ( 20 Jul 2023 02:50 )    Color: x / Appearance: x / SG: x / pH: x  Gluc: 131 mg/dL / Ketone: x  / Bili: x / Urobili: x   Blood: x / Protein: x / Nitrite: x   Leuk Esterase: x / RBC: x / WBC x   Sq Epi: x / Non Sq Epi: x / Bacteria: x        < from: CT Angio Head w/ IV Cont (07.20.23 @ 11:19) >    IMPRESSION: Abnormal area of high attenuation seen involving the medial   right frontal region as described above    CTA of the neck and [place] Medrano appears unremarkable    < end of copied text >    < from: CT Angio Neck w/ IV Cont (07.20.23 @ 11:18) >    IMPRESSION: Abnormal area of high attenuation seen involving the medial   right frontal region as described above    CTA of the neck and [place] Medrano appears unremarkable      < end of copied text >    < from: CT Head No Cont (07.20.23 @ 03:08) >    IMPRESSION:    Findings concerning for acute/subacute infarct within the right frontal   lobe, with subtle area of relative cortical hyperattenuation which may   represent petechial hemorrhage. MRI brain recommended for further   characterization.    No mass effect.    These findings were discussed with, and acknowledged by, Dr. Orodnez at    7/20/2023 5:20 AM  by Dr. Silke Grullon.    < end of copied text >    EKG: Personally reviewed/interpreted - SR 72. TWI leads V2, V3. QTc 457.

## 2023-07-20 NOTE — H&P ADULT - PROBLEM SELECTOR PLAN 6
Elevated lipids.  -Rec starting statin as part of standard of care. Pt declining. States she has been eating healthy and walking more - has not seen any beneficial response in terms of weight loss and hyperlipidemia.  -As she continues to decline statin - order DCed.  -DASH diet after passing dysphagia screen.  -Nutrition c/s.      DVT ppx- Xarelto

## 2023-07-20 NOTE — ED ADULT NURSE NOTE - OBJECTIVE STATEMENT
Pt presents to 27 AxOx4 amb self presenting with R sided numbness. Pt states around 12:30AM/1AM she began feeling R sided facial and neck numbness. Explains she took an aspirin and symptoms have since began to resolve. NSR on cardiac monitor, states she has a hx of AFib intermittently on metoprolol for management, became concerned when she felt the facial tingling. Family at bedside. No facial droop, slurred speech noted. Strength bilaterally equal, no drifts noted. PERRLA. Denies Hx of strokes or MI. No acute distress noted at this time. Respirations even and unlabored. Denies chest pain, SOB, N/V/D, recent fevers. Pt presents to 27 AxOx4 amb self presenting with R sided numbness. Pt states around 12:30AM/1AM she began feeling R sided facial and neck numbness. Explains she took an aspirin and symptoms have since began to resolve. NSR on cardiac monitor, states she has a hx of AFib intermittently on metoprolol for management, became concerned when she felt the facial tingling. Family at bedside. No facial droop or slurred speech noted. Strength bilaterally equal, no drifts noted. PERRLA. Denies Hx of strokes or MI. No acute distress noted at this time. Respirations even and unlabored. Denies chest pain, SOB, N/V/D, recent fevers.

## 2023-07-20 NOTE — H&P ADULT - HISTORY OF PRESENT ILLNESS
63F with PMH of hypothyroidism, chronic atrial fibrillation not on AC who presents to the hospital with right facial/shoulder numbness. Sx started overnight around 1230AM. Given progression of sx - she came to the hospital for evaluation. Hx is notable for AF that is  managed w/ PRN metoprolol. Per pt - she did not tolerate metoprolol (fatigue/weight gain), so she does not take it on a standing basis. Pt reports last AF episode 2 weeks ago that lasted longer than her usual episodes. She was seen by cardiologist at the time and had been on metoprolol. Pt reportedly converted out of AFib two days ago. Pt reports having violent sensation in her chest during episodes. Currently, she denied SOB/CP/palps. Her numbness has resolved.     ED Course: 160/128, 73, 15, 98.2F, 100% RA. Refused atorvastatin. No other meds given.

## 2023-07-20 NOTE — H&P ADULT - ASSESSMENT
63F with PMH of chronic atrial fibrillation not on AC, hypothyroidism presenting with right sided facial numbness. Imaging revealed acute/subacute ischemic stroke, likely cardioembolic in origin. Admitted for further neurologic workup.

## 2023-07-20 NOTE — ED PROVIDER NOTE - PHYSICAL EXAMINATION
PHYSICAL EXAM:  CONSTITUTIONAL: Well appearing, awake, alert, oriented to person, place, time/situation and in no apparent distress.  HEAD: Atraumatic  EYES: Clear bilaterally, pupils equal, round and reactive to light.  ENMT: Airway patent, Nasal mucosa clear. Mouth with normal mucosa. Uvula is midline.   CARDIAC: Normal rate, regular rhythm. +S1/S2. No murmurs, rubs or gallops.  RESPIRATORY: Breathing unlabored. Breath sounds clear and equal bilaterally.  ABDOMEN: Soft, nontender, nondistended. No rebound tenderness or guarding.  NEUROLOGICAL: Alert and oriented, no focal deficits, no motor or sensory deficits. CN2-12 intact. Sensation intact x4 extremities.  SKIN: Skin warm and dry. No evidence of rashes or lesions.

## 2023-07-20 NOTE — ED CDU PROVIDER INITIAL DAY NOTE - OBJECTIVE STATEMENT
Initial ED provider note: "63-year-old female past medical history of paroxysmal A-fib and hypothyroidism presenting with right-sided numbness.  Patient states that  for the past 10 days she had been taking oral metoprolol for an episode of atrial fibrillation.  Patient states that this afternoon she noticed that her palpitations resolved, this evening around 399994:30 AM patient noticed sudden onset numbness extending from her right mid scalp down to her right shoulder.  Patient immediately took aspirin and presented to the emergency department as she is concerned for stroke.  Patient states that the numbness resolved shortly after arrival to the department, is no longer having the symptoms.  Denies fever neck pain weakness numbness cough shortness of breath chest pain."    CDU note: Agree with above. Initial ED provider note: "63-year-old female past medical history of paroxysmal A-fib and hypothyroidism presenting with right-sided numbness.  Patient states that  for the past 10 days she had been taking oral metoprolol for an episode of atrial fibrillation.  Patient states that this afternoon she noticed that her palpitations resolved, this evening around [id number]:30 AM patient noticed sudden onset numbness extending from her right mid scalp down to her right shoulder.  Patient immediately took aspirin and presented to the emergency department as she is concerned for stroke.  Patient states that the numbness resolved shortly after arrival to the department, is no longer having the symptoms.  Denies fever neck pain weakness numbness cough shortness of breath chest pain."    CDU note: Agree with above. 63-year-old female past medical history of paroxysmal A-fib and hypothyroidism presenting with right-sided numbness. HD stable. Labs reviewed. CT head shows "Findings concerning for acute/subacute infarct within the right frontal lobe, with subtle area of relative cortical hyperattenuation which may represent petechial hemorrhage. MRI brain recommended for further characterization." Pt is refusing MRI brain. Seen by Neuro, recommend admission for further work up, and consideration of anticoagulation.

## 2023-07-21 ENCOUNTER — TRANSCRIPTION ENCOUNTER (OUTPATIENT)
Age: 63
End: 2023-07-21

## 2023-07-21 VITALS — WEIGHT: 228.84 LBS

## 2023-07-21 LAB
ANION GAP SERPL CALC-SCNC: 11 MMOL/L — SIGNIFICANT CHANGE UP (ref 7–14)
BASOPHILS # BLD AUTO: 0.05 K/UL — SIGNIFICANT CHANGE UP (ref 0–0.2)
BASOPHILS NFR BLD AUTO: 0.6 % — SIGNIFICANT CHANGE UP (ref 0–2)
BUN SERPL-MCNC: 17 MG/DL — SIGNIFICANT CHANGE UP (ref 7–23)
CALCIUM SERPL-MCNC: 8.5 MG/DL — SIGNIFICANT CHANGE UP (ref 8.4–10.5)
CHLORIDE SERPL-SCNC: 107 MMOL/L — SIGNIFICANT CHANGE UP (ref 98–107)
CHOLEST SERPL-MCNC: 193 MG/DL — SIGNIFICANT CHANGE UP
CO2 SERPL-SCNC: 22 MMOL/L — SIGNIFICANT CHANGE UP (ref 22–31)
CREAT SERPL-MCNC: 0.99 MG/DL — SIGNIFICANT CHANGE UP (ref 0.5–1.3)
EGFR: 64 ML/MIN/1.73M2 — SIGNIFICANT CHANGE UP
EOSINOPHIL # BLD AUTO: 0.19 K/UL — SIGNIFICANT CHANGE UP (ref 0–0.5)
EOSINOPHIL NFR BLD AUTO: 2.5 % — SIGNIFICANT CHANGE UP (ref 0–6)
GLUCOSE SERPL-MCNC: 92 MG/DL — SIGNIFICANT CHANGE UP (ref 70–99)
HCT VFR BLD CALC: 32.2 % — LOW (ref 34.5–45)
HCV AB S/CO SERPL IA: 0.08 S/CO — SIGNIFICANT CHANGE UP (ref 0–0.99)
HCV AB SERPL-IMP: SIGNIFICANT CHANGE UP
HDLC SERPL-MCNC: 95 MG/DL — SIGNIFICANT CHANGE UP
HGB BLD-MCNC: 10.9 G/DL — LOW (ref 11.5–15.5)
IANC: 3.33 K/UL — SIGNIFICANT CHANGE UP (ref 1.8–7.4)
IMM GRANULOCYTES NFR BLD AUTO: 0.1 % — SIGNIFICANT CHANGE UP (ref 0–0.9)
LIPID PNL WITH DIRECT LDL SERPL: 84 MG/DL — SIGNIFICANT CHANGE UP
LYMPHOCYTES # BLD AUTO: 3.63 K/UL — HIGH (ref 1–3.3)
LYMPHOCYTES # BLD AUTO: 47 % — HIGH (ref 13–44)
MAGNESIUM SERPL-MCNC: 2.3 MG/DL — SIGNIFICANT CHANGE UP (ref 1.6–2.6)
MCHC RBC-ENTMCNC: 30 PG — SIGNIFICANT CHANGE UP (ref 27–34)
MCHC RBC-ENTMCNC: 33.9 GM/DL — SIGNIFICANT CHANGE UP (ref 32–36)
MCV RBC AUTO: 88.7 FL — SIGNIFICANT CHANGE UP (ref 80–100)
MONOCYTES # BLD AUTO: 0.51 K/UL — SIGNIFICANT CHANGE UP (ref 0–0.9)
MONOCYTES NFR BLD AUTO: 6.6 % — SIGNIFICANT CHANGE UP (ref 2–14)
NEUTROPHILS # BLD AUTO: 3.33 K/UL — SIGNIFICANT CHANGE UP (ref 1.8–7.4)
NEUTROPHILS NFR BLD AUTO: 43.2 % — SIGNIFICANT CHANGE UP (ref 43–77)
NON HDL CHOLESTEROL: 98 MG/DL — SIGNIFICANT CHANGE UP
NRBC # BLD: 0 /100 WBCS — SIGNIFICANT CHANGE UP (ref 0–0)
NRBC # FLD: 0 K/UL — SIGNIFICANT CHANGE UP (ref 0–0)
PHOSPHATE SERPL-MCNC: 3.4 MG/DL — SIGNIFICANT CHANGE UP (ref 2.5–4.5)
PLATELET # BLD AUTO: 271 K/UL — SIGNIFICANT CHANGE UP (ref 150–400)
POTASSIUM SERPL-MCNC: 4 MMOL/L — SIGNIFICANT CHANGE UP (ref 3.5–5.3)
POTASSIUM SERPL-SCNC: 4 MMOL/L — SIGNIFICANT CHANGE UP (ref 3.5–5.3)
RBC # BLD: 3.63 M/UL — LOW (ref 3.8–5.2)
RBC # FLD: 13.9 % — SIGNIFICANT CHANGE UP (ref 10.3–14.5)
SODIUM SERPL-SCNC: 140 MMOL/L — SIGNIFICANT CHANGE UP (ref 135–145)
TRIGL SERPL-MCNC: 69 MG/DL — SIGNIFICANT CHANGE UP
WBC # BLD: 7.72 K/UL — SIGNIFICANT CHANGE UP (ref 3.8–10.5)
WBC # FLD AUTO: 7.72 K/UL — SIGNIFICANT CHANGE UP (ref 3.8–10.5)

## 2023-07-21 PROCEDURE — 70450 CT HEAD/BRAIN W/O DYE: CPT | Mod: 26

## 2023-07-21 PROCEDURE — 99239 HOSP IP/OBS DSCHRG MGMT >30: CPT

## 2023-07-21 RX ORDER — METOPROLOL TARTRATE 50 MG
0.5 TABLET ORAL
Qty: 30 | Refills: 0
Start: 2023-07-21 | End: 2023-08-19

## 2023-07-21 RX ORDER — RIVAROXABAN 15 MG-20MG
1 KIT ORAL
Qty: 30 | Refills: 0
Start: 2023-07-21 | End: 2023-08-19

## 2023-07-21 RX ADMIN — Medication 12.5 MILLIGRAM(S): at 05:42

## 2023-07-21 RX ADMIN — RIVAROXABAN 15 MILLIGRAM(S): KIT at 10:25

## 2023-07-21 NOTE — DISCHARGE NOTE PROVIDER - NSDCFUADDAPPT_GEN_ALL_CORE_FT
- Upon discharge, followup with   Dr. Catalan: [phone] [address] [place] [address]

## 2023-07-21 NOTE — PROGRESS NOTE ADULT - ASSESSMENT
63 y.o. obese  F with known paroxysmal afib (diagnosed in 2018, on metoprolol prn; previoulsy on eliquis but said she had flushing) and hypothyroidism presented to [facility] ED due to R sided numbness. Neurology consulted for concern for stroke and abnormal CTH finding. LKN 12:00 hr on 7/20/23. Had 1-1.5 hrs of R V1 region, R temporal region, and R shoulder numbness that resolved at this time. Had been seeing cardiologist recently due to 10 day of sustained afib, which reverted back to sinus with metoprolol use. Had adverse reaction of flushing with eliquis back in 2018, so does not take eliquis. Advised to take aspirin when needed because her afib usually lasts only 18-24 hrs, once a year. Aspirin also makes her bruise easily. Neuro exam is non-focal.   CTH showing acute/subacute infarct in R frontal lobe, with subtle area os hyperattenuation which may represent petechial hemorrhage.   NIHSS: 0   MRS: 0  A1c 117  LDL 5.4  TTE done 7/20 f/u read   CTA H/N R frontal high attenuation. no LVO     Impression: R NORMAN stroke likely CE from AF     Recommendations:      - cardio eval for AF   - Start DOAC.  prefer eliquis 5mg BID but since had AE can consider xarelto or pradaxa. ; Xarelto was chosen but still not started.  would like repeat CTH 24hrs after DOAC has been started to monitor for hemorrhage.    - would start DOAC now.  repeat CTH in 24hrs to moniotr for hemorrhage.   - MRI brain, refusing   - High dose statin therapy - atorvastatin 80mg PO daily. LDL goal <70mg/dL.  - f/u TTE   - telemetry  - BP normotesnive   - check FS, glucose control <180  - GI/DVT ppx   - Thank you for allowing me to participate in the care of this patient. Call with questions.    - Upon discharge, PT should F/U with Dr. Catalan: [phone] [address] [place] [address]     Dalton Catalan MD  Vascular Neurology  Office: [phone] .    
63F with PMH of chronic atrial fibrillation not on AC, hypothyroidism presenting with right sided facial numbness. Imaging revealed acute/subacute ischemic stroke, likely cardioembolic in origin. Admitted for further neurologic workup.

## 2023-07-21 NOTE — DISCHARGE NOTE PROVIDER - NSDCMRMEDTOKEN_GEN_ALL_CORE_FT
levothyroxine 100 mcg (0.1 mg) oral tablet: 1 orally once a day  metoprolol tartrate 25 mg oral tablet: 0.5 tab(s) orally 2 times a day  rivaroxaban 15 mg oral tablet: 1 tab(s) orally once a day (at bedtime)

## 2023-07-21 NOTE — DISCHARGE NOTE NURSING/CASE MANAGEMENT/SOCIAL WORK - PATIENT PORTAL LINK FT
You can access the FollowMyHealth Patient Portal offered by [facility] by registering at the following website: [email or web address]. By joining [facility]’s FollowMyHealth portal, you will also be able to view your health information using other applications (apps) compatible with our system.

## 2023-07-21 NOTE — DISCHARGE NOTE NURSING/CASE MANAGEMENT/SOCIAL WORK - NSDCPEXARELTOREACT_GEN_ALL_CORE
Pt admitted to ICU overnight for emergent dialysis due to hyperkalemia and hyperphosphatemia. Pt is alert and oriented x4. Follows commands and verbalizes needs to staff. O2 sats >95% on room air. Pt is anuric. Last BM 3/5/22. Pt has bilateral BKAs. Pt was hypoglycemic on admit. After PRN D10 bolus was administered, BG slightly improved to 70. Orders were placed per Dr. Dorman (eICU), to start pt on continuous D10 gtt. BG has been stable wnl, last . HD was started briefly after pts arrival to unit. Pt tolerated dialysis well; 2 liters removed. Updated team (TRAVIS Ramires NP) on pt status and AM labs. K+, and Phos are trending down. Plan of care reviewed with pt this AM. Report given to oncoming Oliva STARR.   Rivaroxaban/Xarelto increases your risk for bleeding. Notify your doctor if you experience any of the following side effects: unusual bleeding or bruising, vomiting blood or coffee ground-like material, red or black stool, itching or hives, chest tightness, trouble breathing, swelling in your face or hands, swelling in your mouth or throat, change in how much or how often you urinate, red or brown urine, heavy menstrual or vaginal bleeding, or blistering or peeling skin. When Rivaroxaban/Xarelto is taken with other medicines, they can affect how it works. Taking other medications such as aspirin, antibiotics, antifungals, blood thinners, nonsteroidal anti-inflammatories, and medications that treat depression can increase your risk of bleeding. It is very important to tell your health care provider about all of the other medicines, including over-the-counter medications, herbs, and vitamins you are taking.  DO NOT start, stop, or change the dosage of any medicine, including over-the-counter medicines, vitamins, and herbal products without your doctor’s approval.  Any products containing aspirin or are nonsteroidal anti-inflammatories lessen the blood’s ability to form clots and adds to the effect of Rivaroxaban/Xarelto. Never take aspirin or medicines that contain aspirin without speaking to your doctor.

## 2023-07-21 NOTE — DISCHARGE NOTE PROVIDER - HOSPITAL COURSE
63F with PMH of chronic atrial fibrillation not on AC, hypothyroidism presenting with right sided facial numbness. Imaging revealed acute/subacute ischemic stroke, likely cardioembolic in origin. Admitted for further neurologic workup.      Problem/Plan - 1:  ·  Problem: Acute ischemic stroke.   ·  Plan: -Neuro deficits noted on admission. Imaging revealed stroke.  -NIHSS 0  -ECHO w/ bubble unremarkable. NO PFO.  -CTA H/N - no acute findings. CTH w/ "acute/subacute infarct within the right frontal lobe, with subtle area of relative cortical hyperattenuation which may represent petechial hemorrhage."  - Tele monitor : NSR  - No need for permissive HTN  -Neuro recs appreciated , pt declined MRI, neuro recs repeat CT head on 7/21 which showed no change  - Pt was started on Xarelto 15 mg daily for h/o PAF and risk for embolic stroke  - Pt was cleared by neurology for discharge home on 7/21, outpt f/u PCP/Cardiology and Neurology

## 2023-07-21 NOTE — DISCHARGE NOTE PROVIDER - NSDCCPCAREPLAN_GEN_ALL_CORE_FT
PRINCIPAL DISCHARGE DIAGNOSIS  Diagnosis: Ischemic stroke  Assessment and Plan of Treatment: you had a stroke in the right frontal lobe of your brain, you were started on a blood thinner Xarelto 15 mg daily to prevent stroke as you have history of paroxysmal Atrial fibrillation, follow up with neurology and your cardiologist as outpatient. You had repeat CT head on 7/21 which was stable

## 2023-07-21 NOTE — DISCHARGE NOTE PROVIDER - CARE PROVIDER_API CALL
Dalton Catalan  Neurology  [address], [address]  [place] [address]  Phone: [phone]  Fax: [phone]  Follow Up Time: 1 week

## 2023-07-21 NOTE — DISCHARGE NOTE PROVIDER - NSDCHC_MEDRECSTATUS_GEN_ALL_CORE
Admission Reconciliation is Completed  Discharge Reconciliation is Completed Health Care Proxy (HCP)

## 2023-07-21 NOTE — CHART NOTE - NSCHARTNOTEFT_GEN_A_CORE
Called neurology team and they reviewed CT head done today and stated patient was cleared neurologically for discharge and to followup with Dr. Catalan: [phone] [address] [place], [place] .  Attending made aware.

## 2023-07-21 NOTE — PROGRESS NOTE ADULT - PROBLEM SELECTOR PLAN 2
-Currently in SR.  -Will start low dose metoprolol 12.5 BID.  -Started on Xarelto.  -Counseled pt on standing metoprolol. Aware of her concerns w/ adverse effect, which primarily is w/ weight gain and bloating of mid body. For now - will try lower dose of BB.  -Monitor telemetry.  - TSH 2.61.  -Further f/u with PCP/Cardiology (Dr Breezy Wright).

## 2023-07-21 NOTE — DIETITIAN INITIAL EVALUATION ADULT - PERTINENT LABORATORY DATA
07-21    140  |  107  |  17  ----------------------------<  92  4.0   |  22  |  0.99    Ca    8.5      21 Jul 2023 05:30  Phos  3.4     07-21  Mg     2.30     07-21    TPro  7.7  /  Alb  4.3  /  TBili  0.8  /  DBili  x   /  AST  26  /  ALT  31  /  AlkPhos  123<H>  07-20  A1C with Estimated Average Glucose Result: 5.4 % (07-20-23 @ 02:50)

## 2023-07-21 NOTE — DIETITIAN INITIAL EVALUATION ADULT - OTHER INFO
63F with PMH of chronic atrial fibrillation not on AC, hypothyroidism presenting with right sided facial numbness. Imaging revealed acute/subacute ischemic stroke, likely cardioembolic in origin.    Pt seen and reports 75% intake of meals with No GI distress (nausea/vomiting/diarrhea/constipation.) at present. Pt does not want to know her current wt. Obtained wt via bed scale- 228.8# (7/21/23). Noted skin intact, no edema per nursing flow sheet. Pt does not take dairy products. Encouraged to fill put menu for food preferences.

## 2023-07-21 NOTE — PROGRESS NOTE ADULT - SUBJECTIVE AND OBJECTIVE BOX
Neurology Progress Note    S: Patient seen and examined. No new events overnight. patient denied CP, SOB, HA or pain.     Medication:  levothyroxine 100 MICROGram(s) Oral daily  metoprolol tartrate 12.5 milliGRAM(s) Oral two times a day  rivaroxaban 15 milliGRAM(s) Oral with dinner      Vitals:  Vital Signs Last 24 Hrs  T(C): 36.5 (21 Jul 2023 05:40), Max: 36.9 (20 Jul 2023 10:45)  T(F): 97.7 (21 Jul 2023 05:40), Max: 98.4 (20 Jul 2023 10:45)  HR: 62 (21 Jul 2023 05:40) (62 - 70)  BP: 136/66 (21 Jul 2023 05:40) (126/60 - 152/77)  BP(mean): --  RR: 16 (21 Jul 2023 05:40) (16 - 16)  SpO2: 98% (21 Jul 2023 05:40) (96% - 98%)    Parameters below as of 21 Jul 2023 05:40  Patient On (Oxygen Delivery Method): room air      General:  Constitutional: Obese Female, appears stated age, in no apparent distress including pain  Head: Normocephalic & atraumatic.  Extremities: R elbow bruises    Neurological (>12):  MS: Eyes open. Maintains eye opening to verbal stimuli. Awake, alert, oriented to person, place, situation, time. Normal affect. Follows all commands. Speech is clear, fluent with good repetition & comprehension (able to name objects socks, alcohol pad, hand)    CNs: VFF. EOMI no nystagmus, no diplopia. V1-3 intact to LT, well developed masseter muscles b/l. No facial asymmetry b/l, full eye closure strength b/l. Hearing grossly normal (rubbing fingers) b/l. Symmetric palate elevation in midline. Head turning & shoulder shrug intact b/l. Tongue midline, normal movements, no atrophy.    Motor: Normal muscle bulk & tone. No noticeable tremor. No pronator drift.              Deltoid	Biceps	Triceps	   R	5	5	5	5	  L	5	5	5	5	  	H-Flex	K-Flex	K-Ext	D-Flex	P-Flex  R	5	5	5	5	5		   L	5	5	5	5	5		     Sensation: Intact to LT b/l throughout.     Cortical: Extinction on DSS (neglect): none    Reflexes:              Biceps(C5)       BR(C6)     Triceps(C7)               Patellar(L4)    Achilles(S1)    Plantar Resp  R	2	          2	             2		        1		    2		Down   L	2	          2	             2		        1		    2		Down     Coordination: intact rapid-alt movements. No dysmetria to FTN    Gait: Deferred        I personally reviewed the below data/images/labs:      CBC Full  -  ( 21 Jul 2023 05:30 )  WBC Count : 7.72 K/uL  RBC Count : 3.63 M/uL  Hemoglobin : 10.9 g/dL  Hematocrit : 32.2 %  Platelet Count - Automated : 271 K/uL  Mean Cell Volume : 88.7 fL  Mean Cell Hemoglobin : 30.0 pg  Mean Cell Hemoglobin Concentration : 33.9 gm/dL  Auto Neutrophil # : 3.33 K/uL  Auto Lymphocyte # : 3.63 K/uL  Auto Monocyte # : 0.51 K/uL  Auto Eosinophil # : 0.19 K/uL  Auto Basophil # : 0.05 K/uL  Auto Neutrophil % : 43.2 %  Auto Lymphocyte % : 47.0 %  Auto Monocyte % : 6.6 %  Auto Eosinophil % : 2.5 %  Auto Basophil % : 0.6 %    07-21    140  |  107  |  17  ----------------------------<  92  4.0   |  22  |  0.99    Ca    8.5      21 Jul 2023 05:30  Phos  3.4     07-21  Mg     2.30     07-21    TPro  7.7  /  Alb  4.3  /  TBili  0.8  /  DBili  x   /  AST  26  /  ALT  31  /  AlkPhos  123<H>  07-20    LIVER FUNCTIONS - ( 20 Jul 2023 02:50 )  Alb: 4.3 g/dL / Pro: 7.7 g/dL / ALK PHOS: 123 U/L / ALT: 31 U/L / AST: 26 U/L / GGT: x           PT/INR - ( 20 Jul 2023 02:50 )   PT: 12.3 sec;   INR: 1.06 ratio         PTT - ( 20 Jul 2023 02:50 )  PTT:32.2 sec  Urinalysis Basic - ( 21 Jul 2023 05:30 )    Color: x / Appearance: x / SG: x / pH: x  Gluc: 92 mg/dL / Ketone: x  / Bili: x / Urobili: x   Blood: x / Protein: x / Nitrite: x   Leuk Esterase: x / RBC: x / WBC x   Sq Epi: x / Non Sq Epi: x / Bacteria: x        < from: CT Head No Cont (07.20.23 @ 03:08) >  IMPRESSION:    Findings concerning for acute/subacute infarct within the right frontal   lobe, with subtle area of relative cortical hyperattenuation which may   represent petechial hemorrhage. MRI brain recommended for further   characterization.    No mass effect.    < end of copied text >    
Dr. Francy Linton  Pager [phone]    PROGRESS NOTE:     Patient is a 63y old  Female who presents with a chief complaint of Acute CVA (21 Jul 2023 08:22)      SUBJECTIVE / OVERNIGHT EVENTS: denies chest pain or sob   ADDITIONAL REVIEW OF SYSTEMS: afebrile , reports right facial numbness resolved, no significant headache or focal weakness , nsr on tele    MEDICATIONS  (STANDING):  levothyroxine 100 MICROGram(s) Oral daily  metoprolol tartrate 12.5 milliGRAM(s) Oral two times a day  rivaroxaban 15 milliGRAM(s) Oral <User Schedule>    MEDICATIONS  (PRN):      CAPILLARY BLOOD GLUCOSE        I&O's Summary      PHYSICAL EXAM:  Vital Signs Last 24 Hrs  T(C): 36.6 (21 Jul 2023 11:46), Max: 36.9 (20 Jul 2023 14:36)  T(F): 97.8 (21 Jul 2023 11:46), Max: 98.4 (20 Jul 2023 14:36)  HR: 59 (21 Jul 2023 11:46) (59 - 70)  BP: 135/70 (21 Jul 2023 11:46) (131/69 - 152/77)  BP(mean): --  RR: 17 (21 Jul 2023 11:46) (16 - 17)  SpO2: 97% (21 Jul 2023 11:46) (96% - 98%)    Parameters below as of 21 Jul 2023 11:46  Patient On (Oxygen Delivery Method): room air      CONSTITUTIONAL: NAD, well-developed  RESPIRATORY: Normal respiratory effort; lungs are clear to auscultation bilaterally  CARDIOVASCULAR: Regular rate and rhythm, normal S1 and S2, no murmur/rub/gallop; No lower extremity edema; Peripheral pulses are 2+ bilaterally  ABDOMEN: Nontender to palpation, normoactive bowel sounds, no rebound/guarding; No hepatosplenomegaly  MUSCULOSKELETAL: no clubbing or cyanosis of digits; no joint swelling or tenderness to palpation  PSYCH: A+O to person, place, and time; affect appropriate    LABS:                        10.9   7.72  )-----------( 271      ( 21 Jul 2023 05:30 )             32.2     07-21    140  |  107  |  17  ----------------------------<  92  4.0   |  22  |  0.99    Ca    8.5      21 Jul 2023 05:30  Phos  3.4     07-21  Mg     2.30     07-21    TPro  7.7  /  Alb  4.3  /  TBili  0.8  /  DBili  x   /  AST  26  /  ALT  31  /  AlkPhos  123<H>  07-20    PT/INR - ( 20 Jul 2023 02:50 )   PT: 12.3 sec;   INR: 1.06 ratio         PTT - ( 20 Jul 2023 02:50 )  PTT:32.2 sec      Urinalysis Basic - ( 21 Jul 2023 05:30 )    Color: x / Appearance: x / SG: x / pH: x  Gluc: 92 mg/dL / Ketone: x  / Bili: x / Urobili: x   Blood: x / Protein: x / Nitrite: x   Leuk Esterase: x / RBC: x / WBC x   Sq Epi: x / Non Sq Epi: x / Bacteria: x          RADIOLOGY & ADDITIONAL TESTS:  Results Reviewed:   Imaging Personally Reviewed:  < from: CT Angio Head w/ IV Cont (07.20.23 @ 11:19) >  IMPRESSION: Abnormal area of high attenuation seen involving the medial   right frontal region as described above    CTA of the neck and Inaja Medrano appears unremarkable        Electrocardiogram Personally Reviewed:    COORDINATION OF CARE:  Care Discussed with Consultants/Other Providers [Y/N]: sugey bear, repeat ct head today and if no acute change pt can be discharged if cleared by neuro  Prior or Outpatient Records Reviewed [Y/N]:

## 2023-07-21 NOTE — DISCHARGE NOTE NURSING/CASE MANAGEMENT/SOCIAL WORK - NSDCPEFALRISK_GEN_ALL_CORE
For information on Fall & Injury Prevention, visit: [email or web address] OR  [email or web address] OR  https://www.cdc.gov/steadi/patient.html

## 2023-07-21 NOTE — PROGRESS NOTE ADULT - PROBLEM SELECTOR PLAN 1
-Neuro deficits noted on admission. Imaging revealed stroke.  -NIHSS 0  -ECHO w/ bubble unremarkable. NO PFO.  -CTA H/N - no acute findings. CTH w/ "acute/subacute infarct within the right frontal lobe, with subtle area of relative cortical hyperattenuation which may represent petechial hemorrhage."  -Neuro recs appreciated-->  -MRI refused. Neuro rec repeat CTH in 24h to monitor for hemorrhage.  -Ok to start DOAC , started on Xarelto 15 mg qd  -Normotension ok.  -PT/OT eval. Dysphagia screen.   -Lipid panel elevated. A1c 5.4  -Telemetry monitoring-->nsr on tele, pt reports hx of rare PAF, was on eliquis in past but now off   - dispo: repeat CT head today, if no acute change pt can be discharged home if cleared by neurology

## 2023-07-21 NOTE — DISCHARGE NOTE NURSING/CASE MANAGEMENT/SOCIAL WORK - NSDCFUADDAPPT_GEN_ALL_CORE_FT
- Upon discharge, followup with   Dr. Catalan: [phone] [address] [place] [address]

## 2023-07-21 NOTE — DIETITIAN INITIAL EVALUATION ADULT - PERTINENT MEDS FT
MEDICATIONS  (STANDING):  levothyroxine 100 MICROGram(s) Oral daily  metoprolol tartrate 12.5 milliGRAM(s) Oral two times a day  rivaroxaban 15 milliGRAM(s) Oral <User Schedule>    MEDICATIONS  (PRN):

## 2023-08-21 PROBLEM — E03.9 HYPOTHYROIDISM, UNSPECIFIED: Chronic | Status: ACTIVE | Noted: 2023-07-20

## 2023-08-21 PROBLEM — I48.20 CHRONIC ATRIAL FIBRILLATION, UNSPECIFIED: Chronic | Status: ACTIVE | Noted: 2023-07-20

## 2023-09-07 ENCOUNTER — APPOINTMENT (OUTPATIENT)
Dept: CARDIOLOGY | Facility: CLINIC | Age: 63
End: 2023-09-07
Payer: COMMERCIAL

## 2023-09-07 ENCOUNTER — NON-APPOINTMENT (OUTPATIENT)
Age: 63
End: 2023-09-07

## 2023-09-07 VITALS
WEIGHT: 180 LBS | HEIGHT: 63 IN | DIASTOLIC BLOOD PRESSURE: 81 MMHG | BODY MASS INDEX: 31.89 KG/M2 | SYSTOLIC BLOOD PRESSURE: 147 MMHG

## 2023-09-07 DIAGNOSIS — Z00.00 ENCOUNTER FOR GENERAL ADULT MEDICAL EXAMINATION W/OUT ABNORMAL FINDINGS: ICD-10-CM

## 2023-09-07 PROCEDURE — 99205 OFFICE O/P NEW HI 60 MIN: CPT | Mod: 25

## 2023-09-07 PROCEDURE — 93000 ELECTROCARDIOGRAM COMPLETE: CPT

## 2023-09-07 NOTE — DISCUSSION/SUMMARY
[FreeTextEntry1] :   CHADSVASc = 3 (CVA and gender) Recommend Eliquis 5 mg BID Recommend escalating to high intensity statin therapy. [EKG obtained to assist in diagnosis and management of assessed problem(s)] : EKG obtained to assist in diagnosis and management of assessed problem(s)

## 2023-09-07 NOTE — REASON FOR VISIT
[FreeTextEntry1] : right sided head and neck numbness a few months ago, LIJ AF on and off.  GF of Ed Stevo. lives in [place] but staying down here. Dr. Denis --> neurology. had recent EEG and doppler  on Eliquis 5 years ago...did not tolerate it. Now taking once a day.  nosebleed the other day... worried. sensitive to medicines. gets dizzy.  on Xarelto she had a TERRIBLE reaction, meningitis symptoms. felt like a concussion.  metoprolol succinate 50 mg PRN HR goes down to the 40's, feels unwell. uses a [employer].  otherwise Eliquis and Synthroid.   has never seen EP. only gets AF once a year.  usually occurs in the summer, or under severe stress.   myomectomy tonsillectomy hypothyroid  Dr. Eileen Roman, [facility]. seeing her on the  for CPE.  NOT on ASA. Was given atorvastatin 80 mg.  R NORMAN stroke likely CE from AF.   having some mild memory issues, forgetting words.  wore a month and a half monitor when first diagnosed in .  In April she thought she had NoroVirus, vomiting. lasted 1.5 months. got it from BF? elevated LFTs with supplements. resolved off supplements.   Her father is . He passed of renal cancer in his 60's. Her mother is . She had dementia, pancreatic cancer. She had a sister and 2 brothers, one brother is alive. Sister had liver failure 2// opioids. brother had cancer/melanoma. he was born with cancer.  No kids.  She is working as an .  For exercise she rides a stationary bike (worried about triggering AF), treadmill was triggering AF. working on endurance.   AF feels like dolphins flipping in her chest.  had RIVKA screening. Negative.

## 2023-09-07 NOTE — CARDIOLOGY SUMMARY
[de-identified] : PROCEDURE: Transthoracic echocardiogram with 2-D, M-Mode and complete spectral and color flow Doppler. Patient was injected with 10 cc's of aerosolized saline. Patient was injected with 10 cc's of aerosolized saline. INDICATION: Cerebral infarction, unspecified (I63.9) ------------------------------------------------------------------------ DIMENSIONS: Dimensions:     Normal Values: LA:     4.4 cm    2.0 - 4.0 cm Ao:     2.5 cm    2.0 - 3.8 cm SEPTUM: 1.0 cm    0.6 - 1.2 cm PWT:    1.0 cm    0.6 - 1.1 cm LVIDd:  4.7 cm    3.0 - 5.6 cm LVIDs:  3.0 cm    1.8 - 4.0 cm Derived Variables: LVMI: 89 g/m2 RWT: 0.42 Fractional short: 36 % Ejection Fraction (Modified Comer Rule): 68 % ------------------------------------------------------------------------ OBSERVATIONS: Mitral Valve: Normal mitral valve. No mitral regurgitation seen. Aortic Root: Normal aortic root. Aortic Valve: Normal trileaflet aortic valve. No aortic valve regurgitation seen. Left Atrium: Normal left atrium.  LA volume index = 24 cc/m2. Left Ventricle: Endocardium not well visualized; grossly normal left ventricular systolic function. LVEF calculated using biplane Comer's method is 68%.  Normal left ventricular internal dimensions and wall thicknesses. Normal left ventricular diastolic function. Right Heart: Normal right atrium. Normal right ventricular size and function. Normal tricuspid valve. No tricuspid regurgitation. Normal pulmonic valve. Mild pulmonic regurgitation. Pericardium/PleuraNo pericardial effusion seen. Hemodynamic: Agitated saline injection demonstrates no evidence of a patent foramen ovale. ------------------------------------------------------------------------ CONCLUSIONS: 1. Normal left atrium.  LA volume index = 24 cc/m2. 2. Normal left ventricular internal dimensions and wall thicknesses. 3. Endocardium not well visualized; grossly normal left ventricular systolic function. LVEF calculated using biplane Comer's method is 68%.  4. Normal left ventricular diastolic function. 5. Normal right atrium. 6. Normal right ventricular size and function. 7. Normal tricuspid valve. No tricuspid regurgitation. 8. Agitated saline injection demonstrates no evidence of a patent foramen ovale. 9. No pericardial effusion seen. *** No previous Echo exam. ------------------------------------------------------------------------ Confirmed on  7/20/2023 - 11:04:53 by Perlita Fountain M.D.

## 2023-09-18 NOTE — ED PROVIDER NOTE - IV ALTEPLASE EXCL ABS HIDDEN
Attending and PA/NP shared services statement (NON-critical care):
Attending and PA/NP shared services statement (NON-critical care):
show

## 2023-11-21 ENCOUNTER — EMERGENCY (EMERGENCY)
Facility: HOSPITAL | Age: 63
LOS: 1 days | Discharge: HOME | End: 2023-11-21
Attending: EMERGENCY MEDICINE | Admitting: EMERGENCY MEDICINE
Payer: COMMERCIAL

## 2023-11-21 VITALS
TEMPERATURE: 98 F | RESPIRATION RATE: 18 BRPM | DIASTOLIC BLOOD PRESSURE: 54 MMHG | HEART RATE: 67 BPM | OXYGEN SATURATION: 98 % | SYSTOLIC BLOOD PRESSURE: 156 MMHG

## 2023-11-21 VITALS
OXYGEN SATURATION: 100 % | RESPIRATION RATE: 20 BRPM | DIASTOLIC BLOOD PRESSURE: 78 MMHG | TEMPERATURE: 98 F | HEART RATE: 62 BPM | SYSTOLIC BLOOD PRESSURE: 134 MMHG

## 2023-11-21 DIAGNOSIS — Z98.890 OTHER SPECIFIED POSTPROCEDURAL STATES: Chronic | ICD-10-CM

## 2023-11-21 DIAGNOSIS — G93.9 DISORDER OF BRAIN, UNSPECIFIED: ICD-10-CM

## 2023-11-21 DIAGNOSIS — Z90.89 ACQUIRED ABSENCE OF OTHER ORGANS: Chronic | ICD-10-CM

## 2023-11-21 LAB
ALBUMIN SERPL ELPH-MCNC: 4.4 G/DL — SIGNIFICANT CHANGE UP (ref 3.3–5)
ALBUMIN SERPL ELPH-MCNC: 4.4 G/DL — SIGNIFICANT CHANGE UP (ref 3.3–5)
ALP SERPL-CCNC: 137 U/L — HIGH (ref 40–120)
ALP SERPL-CCNC: 137 U/L — HIGH (ref 40–120)
ALT FLD-CCNC: 22 U/L — SIGNIFICANT CHANGE UP (ref 4–33)
ALT FLD-CCNC: 22 U/L — SIGNIFICANT CHANGE UP (ref 4–33)
ANION GAP SERPL CALC-SCNC: 12 MMOL/L — SIGNIFICANT CHANGE UP (ref 7–14)
ANION GAP SERPL CALC-SCNC: 12 MMOL/L — SIGNIFICANT CHANGE UP (ref 7–14)
APPEARANCE UR: CLEAR — SIGNIFICANT CHANGE UP
APPEARANCE UR: CLEAR — SIGNIFICANT CHANGE UP
APTT BLD: 34.6 SEC — SIGNIFICANT CHANGE UP (ref 24.5–35.6)
APTT BLD: 34.6 SEC — SIGNIFICANT CHANGE UP (ref 24.5–35.6)
AST SERPL-CCNC: 23 U/L — SIGNIFICANT CHANGE UP (ref 4–32)
AST SERPL-CCNC: 23 U/L — SIGNIFICANT CHANGE UP (ref 4–32)
BASOPHILS # BLD AUTO: 0.05 K/UL — SIGNIFICANT CHANGE UP (ref 0–0.2)
BASOPHILS # BLD AUTO: 0.05 K/UL — SIGNIFICANT CHANGE UP (ref 0–0.2)
BASOPHILS NFR BLD AUTO: 0.5 % — SIGNIFICANT CHANGE UP (ref 0–2)
BASOPHILS NFR BLD AUTO: 0.5 % — SIGNIFICANT CHANGE UP (ref 0–2)
BILIRUB SERPL-MCNC: 0.6 MG/DL — SIGNIFICANT CHANGE UP (ref 0.2–1.2)
BILIRUB SERPL-MCNC: 0.6 MG/DL — SIGNIFICANT CHANGE UP (ref 0.2–1.2)
BILIRUB UR-MCNC: NEGATIVE — SIGNIFICANT CHANGE UP
BILIRUB UR-MCNC: NEGATIVE — SIGNIFICANT CHANGE UP
BLOOD GAS VENOUS COMPREHENSIVE RESULT: SIGNIFICANT CHANGE UP
BLOOD GAS VENOUS COMPREHENSIVE RESULT: SIGNIFICANT CHANGE UP
BUN SERPL-MCNC: 20 MG/DL — SIGNIFICANT CHANGE UP (ref 7–23)
BUN SERPL-MCNC: 20 MG/DL — SIGNIFICANT CHANGE UP (ref 7–23)
CALCIUM SERPL-MCNC: 9.4 MG/DL — SIGNIFICANT CHANGE UP (ref 8.4–10.5)
CALCIUM SERPL-MCNC: 9.4 MG/DL — SIGNIFICANT CHANGE UP (ref 8.4–10.5)
CHLORIDE SERPL-SCNC: 103 MMOL/L — SIGNIFICANT CHANGE UP (ref 98–107)
CHLORIDE SERPL-SCNC: 103 MMOL/L — SIGNIFICANT CHANGE UP (ref 98–107)
CO2 SERPL-SCNC: 25 MMOL/L — SIGNIFICANT CHANGE UP (ref 22–31)
CO2 SERPL-SCNC: 25 MMOL/L — SIGNIFICANT CHANGE UP (ref 22–31)
COLOR SPEC: YELLOW — SIGNIFICANT CHANGE UP
COLOR SPEC: YELLOW — SIGNIFICANT CHANGE UP
CREAT SERPL-MCNC: 0.93 MG/DL — SIGNIFICANT CHANGE UP (ref 0.5–1.3)
CREAT SERPL-MCNC: 0.93 MG/DL — SIGNIFICANT CHANGE UP (ref 0.5–1.3)
DIFF PNL FLD: NEGATIVE — SIGNIFICANT CHANGE UP
DIFF PNL FLD: NEGATIVE — SIGNIFICANT CHANGE UP
EGFR: 69 ML/MIN/1.73M2 — SIGNIFICANT CHANGE UP
EGFR: 69 ML/MIN/1.73M2 — SIGNIFICANT CHANGE UP
EOSINOPHIL # BLD AUTO: 0.13 K/UL — SIGNIFICANT CHANGE UP (ref 0–0.5)
EOSINOPHIL # BLD AUTO: 0.13 K/UL — SIGNIFICANT CHANGE UP (ref 0–0.5)
EOSINOPHIL NFR BLD AUTO: 1.3 % — SIGNIFICANT CHANGE UP (ref 0–6)
EOSINOPHIL NFR BLD AUTO: 1.3 % — SIGNIFICANT CHANGE UP (ref 0–6)
GLUCOSE SERPL-MCNC: 111 MG/DL — HIGH (ref 70–99)
GLUCOSE SERPL-MCNC: 111 MG/DL — HIGH (ref 70–99)
GLUCOSE UR QL: NEGATIVE MG/DL — SIGNIFICANT CHANGE UP
GLUCOSE UR QL: NEGATIVE MG/DL — SIGNIFICANT CHANGE UP
HCT VFR BLD CALC: 39.1 % — SIGNIFICANT CHANGE UP (ref 34.5–45)
HCT VFR BLD CALC: 39.1 % — SIGNIFICANT CHANGE UP (ref 34.5–45)
HGB BLD-MCNC: 12.9 G/DL — SIGNIFICANT CHANGE UP (ref 11.5–15.5)
HGB BLD-MCNC: 12.9 G/DL — SIGNIFICANT CHANGE UP (ref 11.5–15.5)
IANC: 5.59 K/UL — SIGNIFICANT CHANGE UP (ref 1.8–7.4)
IANC: 5.59 K/UL — SIGNIFICANT CHANGE UP (ref 1.8–7.4)
IMM GRANULOCYTES NFR BLD AUTO: 0.3 % — SIGNIFICANT CHANGE UP (ref 0–0.9)
IMM GRANULOCYTES NFR BLD AUTO: 0.3 % — SIGNIFICANT CHANGE UP (ref 0–0.9)
INR BLD: 0.96 RATIO — SIGNIFICANT CHANGE UP (ref 0.85–1.18)
INR BLD: 0.96 RATIO — SIGNIFICANT CHANGE UP (ref 0.85–1.18)
KETONES UR-MCNC: NEGATIVE MG/DL — SIGNIFICANT CHANGE UP
KETONES UR-MCNC: NEGATIVE MG/DL — SIGNIFICANT CHANGE UP
LEUKOCYTE ESTERASE UR-ACNC: NEGATIVE — SIGNIFICANT CHANGE UP
LEUKOCYTE ESTERASE UR-ACNC: NEGATIVE — SIGNIFICANT CHANGE UP
LYMPHOCYTES # BLD AUTO: 3.4 K/UL — HIGH (ref 1–3.3)
LYMPHOCYTES # BLD AUTO: 3.4 K/UL — HIGH (ref 1–3.3)
LYMPHOCYTES # BLD AUTO: 34.4 % — SIGNIFICANT CHANGE UP (ref 13–44)
LYMPHOCYTES # BLD AUTO: 34.4 % — SIGNIFICANT CHANGE UP (ref 13–44)
MCHC RBC-ENTMCNC: 28.7 PG — SIGNIFICANT CHANGE UP (ref 27–34)
MCHC RBC-ENTMCNC: 28.7 PG — SIGNIFICANT CHANGE UP (ref 27–34)
MCHC RBC-ENTMCNC: 33 GM/DL — SIGNIFICANT CHANGE UP (ref 32–36)
MCHC RBC-ENTMCNC: 33 GM/DL — SIGNIFICANT CHANGE UP (ref 32–36)
MCV RBC AUTO: 86.9 FL — SIGNIFICANT CHANGE UP (ref 80–100)
MCV RBC AUTO: 86.9 FL — SIGNIFICANT CHANGE UP (ref 80–100)
MONOCYTES # BLD AUTO: 0.69 K/UL — SIGNIFICANT CHANGE UP (ref 0–0.9)
MONOCYTES # BLD AUTO: 0.69 K/UL — SIGNIFICANT CHANGE UP (ref 0–0.9)
MONOCYTES NFR BLD AUTO: 7 % — SIGNIFICANT CHANGE UP (ref 2–14)
MONOCYTES NFR BLD AUTO: 7 % — SIGNIFICANT CHANGE UP (ref 2–14)
NEUTROPHILS # BLD AUTO: 5.59 K/UL — SIGNIFICANT CHANGE UP (ref 1.8–7.4)
NEUTROPHILS # BLD AUTO: 5.59 K/UL — SIGNIFICANT CHANGE UP (ref 1.8–7.4)
NEUTROPHILS NFR BLD AUTO: 56.5 % — SIGNIFICANT CHANGE UP (ref 43–77)
NEUTROPHILS NFR BLD AUTO: 56.5 % — SIGNIFICANT CHANGE UP (ref 43–77)
NITRITE UR-MCNC: NEGATIVE — SIGNIFICANT CHANGE UP
NITRITE UR-MCNC: NEGATIVE — SIGNIFICANT CHANGE UP
NRBC # BLD: 0 /100 WBCS — SIGNIFICANT CHANGE UP (ref 0–0)
NRBC # BLD: 0 /100 WBCS — SIGNIFICANT CHANGE UP (ref 0–0)
NRBC # FLD: 0 K/UL — SIGNIFICANT CHANGE UP (ref 0–0)
NRBC # FLD: 0 K/UL — SIGNIFICANT CHANGE UP (ref 0–0)
PH UR: 6 — SIGNIFICANT CHANGE UP (ref 5–8)
PH UR: 6 — SIGNIFICANT CHANGE UP (ref 5–8)
PLATELET # BLD AUTO: 339 K/UL — SIGNIFICANT CHANGE UP (ref 150–400)
PLATELET # BLD AUTO: 339 K/UL — SIGNIFICANT CHANGE UP (ref 150–400)
POTASSIUM SERPL-MCNC: 4.4 MMOL/L — SIGNIFICANT CHANGE UP (ref 3.5–5.3)
POTASSIUM SERPL-MCNC: 4.4 MMOL/L — SIGNIFICANT CHANGE UP (ref 3.5–5.3)
POTASSIUM SERPL-SCNC: 4.4 MMOL/L — SIGNIFICANT CHANGE UP (ref 3.5–5.3)
POTASSIUM SERPL-SCNC: 4.4 MMOL/L — SIGNIFICANT CHANGE UP (ref 3.5–5.3)
PROT SERPL-MCNC: 8 G/DL — SIGNIFICANT CHANGE UP (ref 6–8.3)
PROT SERPL-MCNC: 8 G/DL — SIGNIFICANT CHANGE UP (ref 6–8.3)
PROT UR-MCNC: NEGATIVE MG/DL — SIGNIFICANT CHANGE UP
PROT UR-MCNC: NEGATIVE MG/DL — SIGNIFICANT CHANGE UP
PROTHROM AB SERPL-ACNC: 10.8 SEC — SIGNIFICANT CHANGE UP (ref 9.5–13)
PROTHROM AB SERPL-ACNC: 10.8 SEC — SIGNIFICANT CHANGE UP (ref 9.5–13)
RBC # BLD: 4.5 M/UL — SIGNIFICANT CHANGE UP (ref 3.8–5.2)
RBC # BLD: 4.5 M/UL — SIGNIFICANT CHANGE UP (ref 3.8–5.2)
RBC # FLD: 13.8 % — SIGNIFICANT CHANGE UP (ref 10.3–14.5)
RBC # FLD: 13.8 % — SIGNIFICANT CHANGE UP (ref 10.3–14.5)
SODIUM SERPL-SCNC: 140 MMOL/L — SIGNIFICANT CHANGE UP (ref 135–145)
SODIUM SERPL-SCNC: 140 MMOL/L — SIGNIFICANT CHANGE UP (ref 135–145)
SP GR SPEC: 1.03 — SIGNIFICANT CHANGE UP (ref 1–1.03)
SP GR SPEC: 1.03 — SIGNIFICANT CHANGE UP (ref 1–1.03)
TROPONIN T, HIGH SENSITIVITY RESULT: 9 NG/L — SIGNIFICANT CHANGE UP
TROPONIN T, HIGH SENSITIVITY RESULT: 9 NG/L — SIGNIFICANT CHANGE UP
UROBILINOGEN FLD QL: 0.2 MG/DL — SIGNIFICANT CHANGE UP (ref 0.2–1)
UROBILINOGEN FLD QL: 0.2 MG/DL — SIGNIFICANT CHANGE UP (ref 0.2–1)
WBC # BLD: 9.89 K/UL — SIGNIFICANT CHANGE UP (ref 3.8–10.5)
WBC # BLD: 9.89 K/UL — SIGNIFICANT CHANGE UP (ref 3.8–10.5)
WBC # FLD AUTO: 9.89 K/UL — SIGNIFICANT CHANGE UP (ref 3.8–10.5)
WBC # FLD AUTO: 9.89 K/UL — SIGNIFICANT CHANGE UP (ref 3.8–10.5)

## 2023-11-21 PROCEDURE — 70450 CT HEAD/BRAIN W/O DYE: CPT | Mod: 26,MA,59

## 2023-11-21 PROCEDURE — 93010 ELECTROCARDIOGRAM REPORT: CPT

## 2023-11-21 PROCEDURE — 99291 CRITICAL CARE FIRST HOUR: CPT

## 2023-11-21 PROCEDURE — 99284 EMERGENCY DEPT VISIT MOD MDM: CPT

## 2023-11-21 PROCEDURE — 70496 CT ANGIOGRAPHY HEAD: CPT | Mod: 26,MA

## 2023-11-21 PROCEDURE — 70498 CT ANGIOGRAPHY NECK: CPT | Mod: 26,MA

## 2023-11-21 RX ORDER — ONDANSETRON 8 MG/1
4 TABLET, FILM COATED ORAL ONCE
Refills: 0 | Status: COMPLETED | OUTPATIENT
Start: 2023-11-21 | End: 2023-11-21

## 2023-11-21 NOTE — ED PROVIDER NOTE - CARE PROVIDERS DIRECT ADDRESSES
,[email or web address],[email or web address]

## 2023-11-21 NOTE — ED PROVIDER NOTE - PROGRESS NOTE DETAILS
The pt is clinically sober, AA&Ox3, free from distracting injury.  Throughout our interactions in the ED today, the pt has demonstrated concrete thinking/reasoning, has maintained an orderly/reasonable conversation, appears to have intact insight/judgment/reason and therefore in our opinion has capacity to make decisions.  Given the pt’s presentation, we communicated our concern for brain mass in laymans terms.    The pt verbalized an understanding of our worries. We’ve told the patient that the ED evaluation is incomplete & many troublesome conditions haven’t been r/o. We have discussed the need for further ED w/u so we can get more information about brain mass.  We have discussed the range of possible dx, potential testing & tx options.  We’ve made  numerous efforts to prevent the pt from leaving AMA.  Our discussions included the potential outcomes of leaving AMA, including worsening of their condition, becoming permanently disabled/in pain/critically ill, or death.  Despite these efforts, we were unable to convince the pt to stay.  The pt is refusing any  further care and is leaving against medical advice. We have attempted to offer tx/rx/guidance for any dangerous conditions which are most likely and/or dangerous.  We have answered all questions and have implored the pt to return ASAP to complete the w/u.  A staff member witnessed the patient consenting to AMA.       Discussed specifically with Whitney regarding speaking to her neurologist Dr. Huston prior to leaving, however she is not ready to do that tonight.  She states she will speak to him in the monring and will see her cardiologist Dr. Khan on Monday for further follow up. [place]: Pt is stable and ready for discharge. Strict return precautions given. All questions answered. AMA formed signed. The pt is clinically sober, AA&Ox3, free from distracting injury.  Throughout our interactions in the ED today, the pt has demonstrated concrete thinking/reasoning, has maintained an orderly/reasonable conversation, appears to have intact insight/judgment/reason and therefore in our opinion has capacity to make decisions.  Given the pt’s presentation, we communicated our concern for brain mass in laymans terms.    The pt verbalized an understanding of our worries. We’ve told the patient that the ED evaluation is incomplete & many troublesome conditions haven’t been r/o. We have discussed the need for further ED w/u so we can get more information about brain mass.  We have discussed the range of possible dx, potential testing & tx options.  We’ve made  numerous efforts to prevent the pt from leaving AMA.  Our discussions included the potential outcomes of leaving AMA, including worsening of their condition, becoming permanently disabled/in pain/critically ill, or death.  Despite these efforts, we were unable to convince the pt to stay.  The pt is refusing any  further care and is leaving against medical advice. We have attempted to offer tx/rx/guidance for any dangerous conditions which are most likely and/or dangerous.  We have answered all questions and have implored the pt to return ASAP to complete the w/u.  A staff member witnessed the patient consenting to AMA.       Discussed specifically with Whitney regarding speaking to her neurologist Dr. Huston prior to leaving, however she is not ready to do that tonight.  She states she will speak to him in the morning and will see her cardiologist Dr. Khan on Monday for further follow up.

## 2023-11-21 NOTE — ED ADULT NURSE NOTE - NSFALLUNIVINTERV_ED_ALL_ED
Bed/Stretcher in lowest position, wheels locked, appropriate side rails in place/Call bell, personal items and telephone in reach/Instruct patient to call for assistance before getting out of bed/chair/stretcher/Non-slip footwear applied when patient is off stretcher/[place] to call system/Physically safe environment - no spills, clutter or unnecessary equipment/Purposeful proactive rounding/Room/bathroom lighting operational, light cord in reach

## 2023-11-21 NOTE — ED PROVIDER NOTE - CLINICAL SUMMARY MEDICAL DECISION MAKING FREE TEXT BOX
63F h/o afib, previously on eliquis, changed to ASA.  took ASA this morning.  had epistaxis few days ago.  L arm got numb x 2 1 hour ago.  Previously had a stroke jul 20 of this year.  prevously had R side numbness.  ROS otherwise fatigue reports.  H/o afib, hypothyroid, stroke.  PSHX myomectomy, T & A.  No T.  All - pcn.  Normal exam at present.  Stroke code called.  Unlikely to need tPA.  Likely CDU for MRI. Pt is currently asymptomatic at this time.

## 2023-11-21 NOTE — ED PROVIDER NOTE - PATIENT PORTAL LINK FT
You can access the FollowMyHealth Patient Portal offered by [facility] by registering at the following website: [email or web address]. By joining [employer]’s FollowMyHealth portal, you will also be able to view your health information using other applications (apps) compatible with our system.

## 2023-11-21 NOTE — ED ADULT TRIAGE NOTE - BP NONINVASIVE SYSTOLIC (MM HG)
Subjective   Patient ID: Gilberto is a 53 year old male.    Chief Complaint   Patient presents with   • Office Visit   • Face     Per pt states he noticed half of his face is not moving and assumes he has bells palsy since his father was diagnosed.   • Neck     Per pt states he noticed last week on back of his neck felt discomfort as he \"slept wrong\" and continues to experience it.     HPI   On zoom call yesterday and noticed that only half of mouth is opening  Left side is drooping  Describes as feeling like had novocaine at dentist  No speech changes  No weakness   No difficulty closing left eye  No dryness or lack of tearing   No decreased sensation   No headaches, lightheadedness, CP or SOB  No recent travel  No recent illness   Had \"kink\" in back of neck for the last week -left side, not sure if that is related   Dad did have Bell's palsy last year -feels that what he is experiencing is what he described    Does have DM   Does not regularly check BS  Most recent A1c of 7    Follows with cardiology -appointments earlier this week  Takes BP medications at night         Patient's medications, allergies, past medical, surgical, social and family histories were reviewed and updated as appropriate.  Past Medical History:   Diagnosis Date   • At risk for obstructive sleep apnea    • Coronary artery disease    • Essential (primary) hypertension    • Failed moderate sedation during procedure    • High cholesterol    • Murmur, cardiac    • PONV (postoperative nausea and vomiting)    • Pulmonic valve insufficiency    • RBBB    • Tetralogy of Fallot    • Wears glasses        Past Surgical History:   Procedure Laterality Date   • Cardiac catherization  2021    two procedures   • Cardiac surgery      BT shunt as baby   • Cardiac surgery      Repair of Tetralogy of Fallot (unknown age)       Social History     Tobacco Use   • Smoking status: Never   • Smokeless tobacco: Never   Vaping Use   • Vaping Use: never used   Substance  Use Topics   • Alcohol use: Yes     Comment: Once a year   • Drug use: Never       Family History   Problem Relation Age of Onset   • Hypertension Mother    • Thyroid Mother    • Diabetes Mother    • Heart disease Mother         MI   • Hypertension Father    • Heart disease Father    • Diabetes Maternal Grandmother    • Vision Loss Maternal Grandmother    • Thyroid Maternal Grandmother    • Kidney disease Maternal Grandmother         esrd       Current Outpatient Medications   Medication Sig Dispense Refill   • atorvastatin (LIPITOR) 80 MG tablet Take 1 tablet by mouth daily.     • Semaglutide (Rybelsus) 7 MG Tab Take 1 tablet by mouth daily. 60 tablet 5   • lisinopril (ZESTRIL) 40 MG tablet TAKE 1 TABLET DAILY 90 tablet 0   • hydroCHLOROthiazide (HYDRODIURIL) 12.5 MG tablet Take 1 tablet by mouth daily. 90 tablet 0   • metFORMIN (GLUCOPHAGE) 500 MG tablet Take 2 pills orally in the AM and 1 in the PM for two weeks then take 2 pills in the AM and 2 in the  tablet 0   • atovaquone-proguanil (MALARONE) 250-100 MG per tablet      • aspirin 325 MG tablet      • metoPROLOL succinate (TOPROL-XL) 25 MG 24 hr tablet      • metoPROLOL succinate (TOPROL-XL) 50 MG 24 hr tablet      • atorvastatin (LIPITOR) 80 MG tablet Take 1 tablet by mouth every evening. 30 tablet 0     No current facility-administered medications for this visit.     ALLERGIES:  No Known Allergies    Review of Systems   Constitutional: Negative.    HENT: Negative.    Eyes: Negative.    Respiratory: Negative.    Cardiovascular: Negative.    Musculoskeletal: Negative for myalgias.   Skin: Negative for rash.   Allergic/Immunologic: Negative.    Neurological: Positive for facial asymmetry and numbness. Negative for dizziness, seizures, syncope, speech difficulty, weakness, light-headedness and headaches.     Objective    BP (!) 159/102 (BP Location: LUE - Left upper extremity, Patient Position: Sitting, Cuff Size: Regular)   Pulse 88   Temp 97.6 °F (36.4  °C) (Temporal)   Resp 18   Wt 90.7 kg (200 lb)   BMI 31.67 kg/m²   BSA 2.01 m²   Physical Exam  Vitals and nursing note reviewed.   Constitutional:       General: He is not in acute distress.     Appearance: Normal appearance.   HENT:      Head: Normocephalic and atraumatic.        Comments: Left sided droop at corner of mouth      Right Ear: Tympanic membrane, ear canal and external ear normal.      Left Ear: Tympanic membrane, ear canal and external ear normal.      Nose: Nose normal.      Mouth/Throat:      Lips: No lesions.      Mouth: Mucous membranes are moist. No oral lesions.      Pharynx: Oropharynx is clear. Uvula midline.      Neck: Normal range of motion.   Eyes:      Extraocular Movements: Extraocular movements intact.      Conjunctiva/sclera: Conjunctivae normal.      Pupils: Pupils are equal, round, and reactive to light.   Cardiovascular:      Rate and Rhythm: Normal rate and regular rhythm.      Pulses: Normal pulses.      Heart sounds: No murmur heard.  Pulmonary:      Effort: Pulmonary effort is normal.   Musculoskeletal:         General: Normal range of motion.   Neurological:      General: No focal deficit present.      Mental Status: He is alert and oriented to person, place, and time.      Cranial Nerves: No cranial nerve deficit.      Sensory: No sensory deficit.      Motor: No weakness.   Psychiatric:         Mood and Affect: Mood normal.       Assessment   Problem List Items Addressed This Visit        Cardiac and Vasculature    Essential hypertension    Relevant Medications    atorvastatin (LIPITOR) 80 MG tablet       Endocrine and Metabolic    Type 2 diabetes mellitus with hyperglycemia, without long-term current use of insulin (CMD)   Other Visit Diagnoses     Palsy, Bell's    -  Primary    Relevant Medications    predniSONE (DELTASONE) 50 MG tablet      Symptom onset within 24 hours  Mild currently with facial droop -remainder of exam reassuring  Discussed treatment   Discussed  transient potential for elevated BS but patient not in habit of checking regularly  Other medications reviewed    HTN  -Improved on repeat  -Continue current medications, defer to cardiology and PCP     Will send message to PCP as needs refills on Metformin and Rybelsus  Return precautions reviewed in detail  Patient verbalized understanding of and agreement with plan of care     156

## 2023-11-21 NOTE — ED ADULT NURSE NOTE - FINAL NURSING ELECTRONIC SIGNATURE
PT DAILY TREATMENT NOTE 8    Patient Name: Karla Martin  Date:10/15/2020  : 1950  [x]  Patient  Verified  Payor: [employer] MEDICARE / Plan: [address] / Product Type: Managed Care Medicare /    In SHUA:[id number]  Out time:1250  Total Treatment Time (min): 58  Total Timed Codes (min): 48   Visit #: 6    Treatment Area: Unilateral primary osteoarthritis, right knee [M17.11]    SUBJECTIVE  Pain Level (0-10 scale): 0  Any medication changes, allergies to medications, adverse drug reactions, diagnosis change, or new procedure performed?: [x] No    [] Yes (see summary sheet for update)  Subjective functional status/changes:   [] No changes reported  Pt. Has minor reports of stiffness in R knee. OBJECTIVE  Modality rationale: decrease inflammation, decrease pain and increase tissue extensibility and decrease reported soreness. Min Type Additional Details    [] Estim: []Att   []Unatt  []TENS instruct                 []IFC  []Premod []NMES                       []Other:  []w/US   []w/ice   []w/heat  Position:  Location:    []  Traction: [] Cervical       []Lumbar                       [] Prone          []Supine                       []Intermittent   []Continuous Lbs:  [] before manual  [] after manual    []  Ultrasound: []Continuous   [] Pulsed                           []1MHz   []3MHz Location:  W/cm2:    []  Iontophoresis with dexamethasone         Location: [] Take home patch   [] In clinic    []  Ice     []  heat  []  Ice massage Position:  Location:   10 [x]  Vasopneumatic Device Pressure: [] lo [x] med [] hi   Temp: [x] lo [] med [] hi   [] Skin assessment post-treatment:  []intact []redness- no adverse reaction       []redness  adverse reaction:       45 min Therapeutic Exercise:  [] See flow sheet :   Rationale: increase ROM, increase strength, improve balance and increase proprioception to improve the patients ability to return to PLOF pain free. min Patient Education: [x] Review HEP    [] Progressed/Changed HEP based on:   [] positioning   [] body mechanics   [] transfers   [] heat/ice application        Other Objective/Functional Measures: -5-120 AROM     Pain Level (0-10 scale) post treatment: 0    ASSESSMENT/Changes in Function: Pt. Completed both range of motion and strengthening exercises pain free. Step ups completed without UE support. Incorporated balance exercises inside of parallel bars via BOSU ball. Pt. Did have difficulty using ankle strategy to maintain static balance. SLS lasted an average of 20 secs with a R lateral lean. Pt. Continues to show improvement in both strength and muscular endurance. Patient will continue to benefit from skilled PT services to modify and progress therapeutic interventions, address functional mobility deficits, address ROM deficits, address strength deficits and analyze and address soft tissue restrictions to attain remaining goals.      [x]  See Plan of Care  []  See progress note/recertification  []  See Discharge Summary    PLAN  []  Upgrade activities as tolerated     [x]  Continue plan of care  []  Update interventions per flow sheet       []  Discharge due to:_  []  Other:_      BOOM Luque  10/15/2020  1:57 PM 21-Nov-2023 19:41

## 2023-11-21 NOTE — ED PROVIDER NOTE - PROVIDER TOKENS
PROVIDER:[TOKEN:[[id number]]],PROVIDER:[TOKEN:[[id number]]],PROVIDER:[TOKEN:[[id number]]]

## 2023-11-21 NOTE — ED PROVIDER NOTE - OBJECTIVE STATEMENT
63F h/o afib, previously on eliquis, changed to ASA.  took ASA this morning.  had epistaxis few days ago.  L arm got numb x 2 1 hour ago.  Previously had a stroke jul 20 of this year.  prevously had R side numbness.  ROS otherwise fatigue reports.  H/o afib, hypothyroid, stroke.  PSHX myomectomy, T & A.  No T.  All - pcn.  Normal exam at present.  Stroke code called.  Unlikely to need tPA.  Likely CDU for MRI  VS:  unremarkable except mild HTN    GEN - NAD;   well appearing;   A+O x3   HEAD - NC/AT     ENT - PEERL, EOMI, mucous membranes    moist , no discharge      NECK: Neck supple, non-tender without lymphadenopathy, no masses, no JVD  PULM - CTA b/l,  symmetric breath sounds  COR -  normal heart sounds    ABD - , ND, NT, soft,  BACK - no CVA tenderness, nontender spine     EXTREMS - no edema, no deformity, warm and well perfused    SKIN - no rash    or bruising      NEUROLOGIC - alert, face symmetric, speech fluent, sensation nl, motor no focal deficit. 63F h/o afib, previously on eliquis, changed to ASA.  took ASA this morning.  had epistaxis few days ago.  L arm got numb x 2 1 hour ago.  Previously had a stroke jul 20 of this year.  prevously had R side numbness.  ROS otherwise fatigue reports.  H/o afib, hypothyroid, stroke.  PSHX myomectomy, T & A.  No T.  All - pcn.  Stroke code called.  Normal exam here.  Called by rads pt has R frontal lesion with vasogenic edema now thought to possibly represent a mass possibly meningioma, previously thought to be an old stroke.  Nsx called, advised MRI and further workup.  Pt declining MRI reporting severe claustrophobia, had MRI last time in open MRI.  Pt may not want to stay for further eval.    VS:  unremarkable except mild HTN    GEN - NAD;   well appearing;   A+O x3   HEAD - NC/AT     ENT - PEERL, EOMI, mucous membranes    moist , no discharge      NECK: Neck supple, non-tender without lymphadenopathy, no masses, no JVD  PULM - CTA b/l,  symmetric breath sounds  COR -  normal heart sounds    ABD - , ND, NT, soft,  BACK - no CVA tenderness, nontender spine     EXTREMS - no edema, no deformity, warm and well perfused    SKIN - no rash    or bruising      NEUROLOGIC - alert, face symmetric, speech fluent, sensation nl, motor no focal deficit.

## 2023-11-21 NOTE — CONSULT NOTE ADULT - ASSESSMENT
64yo F PMH hypothyroidism and paroxysmal afib on ASA who presents with transient left arm/hand numbness that is now resolved. Pt is neurologically intact. Pt was seen 7/2023 with suspected stroke for right facial/shoulder numbness/weakness with CTH/CTH showing right frontal hyperdensity compatible with enhancing lesion, unable to r/o possibility of hemorrhage, with recommendation for MRI brain w/ and w/ out, which patient refused. CTH/CTA from today shows similar appearing right anterior frontal vasogenic edema with underlying anteriomedial frontal lesion for suspicious for meningioma.

## 2023-11-21 NOTE — CONSULT NOTE ADULT - SUBJECTIVE AND OBJECTIVE BOX
NEUROSURGERY CONSULT    HPI:   62yo F PMH hypothyroidism, paroxysmal afib (currently on ASA) and suspected stroke in 7/2023 presenting to ED for transient left arm and hand numbness that lasted about 30 seconds and has since resolved. Pt was previously seen in 7/2023 w/ right facial/shoulder numbness and was suspected to have stroke after CTH/CTA showed right frontal hyperdensity compatible w/ enhancing lesion, unable to r/o possibility of hemorrhage. Pt was recommended to have MRI brain, but refused while admitted and followed up as an outpatient. CTH/CTA from today shows similar appearing right anterior frontal vasogenic edema with underlying anteriomedial frontal lesion suspicious for meningioma.       RADIOLOGY:   < from: CT Angio Brain Stroke Protocol  w/ IV Cont (11.21.23 @ 15:04) >  IMPRESSION:    CT brain:  Similar-appearing right anterior frontal vasogenic edema and underlying   right anteromedial frontal lesion which was previously seen to avidly   enhance. Underlying hyperostotic bone. Findings suspicious for the   presence of meningioma.    No hydrocephalus. No acute intracranial hemorrhage.    CTA brain:  No flow-limiting stenosis or vascular aneurysm. No AVM.    Venous system is fairly well opacified, noevidence for venous sinus or   cortical vein thrombosis.    CTA neck:  No flow-limiting stenosis, evidence for arterial dissection, or vascular   aneurysm.    Dr. Cruz discussed these findings with Dr. Spencer on 11/21/2023 3:25 PM   with read back.    --- End of Report ---    < end of copied text >      MEDS:      Vital Signs Last 24 Hrs  T(C): 36.7 (21 Nov 2023 14:34), Max: 36.7 (21 Nov 2023 14:34)  T(F): 98 (21 Nov 2023 14:34), Max: 98 (21 Nov 2023 14:34)  HR: 58 (21 Nov 2023 15:11) (58 - 67)  BP: 146/86 (21 Nov 2023 15:11) (146/86 - 156/54)  BP(mean): --  RR: 20 (21 Nov 2023 15:11) (18 - 20)  SpO2: 100% (21 Nov 2023 15:11) (98% - 100%)    Parameters below as of 21 Nov 2023 15:11  Patient On (Oxygen Delivery Method): room air        LABS:                        12.9   9.89  )-----------( 339      ( 21 Nov 2023 15:00 )             39.1     11-21    140  |  103  |  20  ----------------------------<  111<H>  4.4   |  25  |  0.93    Ca    9.4      21 Nov 2023 15:00    TPro  8.0  /  Alb  4.4  /  TBili  0.6  /  DBili  x   /  AST  23  /  ALT  22  /  AlkPhos  137<H>  11-21    PT/INR - ( 21 Nov 2023 15:00 )   PT: 10.8 sec;   INR: 0.96 ratio         PTT - ( 21 Nov 2023 15:00 )  PTT:34.6 sec      PHYSICAL EXAM:  AOx3, appropriate, follows commands  PERRL, EOMI, face symmetrical   DAILEY x 4 with good strength   Sensation intact to light touch   No pronator drift

## 2023-11-21 NOTE — ED ADULT NURSE NOTE - OBJECTIVE STATEMENT
STROKE Code pt AOX 3 coming with left arm numbness started approx 2Pm and last for few min. denies CP, SOB, upper and lower extremi. equal strength, pt ambulatory to ER, no slur speech, no dysarthric, gait stable, labs sent, IV to right ventral arm 20g angio cath. place.     report off to primary RN  Dahlia Diana RN (facilitator)

## 2023-11-21 NOTE — CONSULT NOTE ADULT - ASSESSMENT
INCOMPLETE   NIHSS: 0   mRS: 0   Patient is not a thrombolytic candidate because they are without disabling deficits on exam.  Patient is not a mechanical thromectomy candidate because no evidence of LVO.    Impression:   63 year old female with a pmh of paroxysmal a fib (diagnosed in 2018, on metoprolol prn) was recently transitioned from eliquis to asa 81mg x2 daily around two weeks ago, presumed R NORMAN stroke likely CE from AF   7/23 with no residual deficits and hypothyroidism who presented to the ED for transient L arm numbness. LKW 1345 patient had sudden onset numbness lasting less than 30 seconds. Denied HA, weakness, dizziness, nv, change in vision or gait. Of note, prior CVA was for R arm (shoulder) numbness with no current residual deficits. Patient was transitioned from eliquis via her Cardiologist.   Her prior R sided numbness was worked up outpatient with EEG that was reportedly negative    CT brain showed a right anterior frontal vasogenic edema and underlying   right anteromedial frontal lesion which was previously seen to avidly   enhance. Underlying hyperostotic bone. Findings suspicious for the   presence of meningioma.    Impression: Transient L arm numbness most likely 2/2 right anterior frontal vasogenic edema and underlying   right anteromedial frontal lesion. Symptoms more likely representative of seizure event less likely stroke.    Recommendations   VEEG to evaluate for abnormal discharges or seizures  Start keppra 500 bid   NSG consulted, rec starting steroids as per ED. No neurological contraindication for steroids  MRI brain w/wo to further characterize mass   seizure precautions   C/w home aspirin  Q4 neurology checks   Lorazepam 2mg IV PRN for seizure activity lasting >3-5mins, >2x/hr, >3x/day, or if GTC , repeat after 1 minute (max dose 0.1 mg/kg)            [] Given concern for seizure, advise the patient with regards to risks and driving privileges associated with the [place] Guidelines. Advise patient regarding the risk of seizures and general seizure safety recommendations including not to be bathing alone, climbing to high places and operating heavy machinery, until cleared by follow-up outpatient Neurology. Reinforce the importance of compliance with medications. Discuss sleep hygiene and the risks of sleep disruption. Discuss the risk of death associated with seizures / SUDEP.     [] Please note: if patient has a convulsion, please document length of episode, specifically what patient was doing paying attention to eye opening vs closure, gaze deviation, shaking of extremities, tongue bite, urinary incontinence, any derangement of vital signs. Generalized motor seizures can have dilated and unreactive pupils, absent oculocephalic reflexes (no dolls eyes), and open eyelids (99% of cases). Head turning from sided to side, pelvic thrusting, bicycling movements, hand waving are NOT manifestations of generalized motor seizures.    Seizure Precautions discussed:  1. No driving for 1 year from date of last seizure as per [place] law  2. No taking a bath alone or showering with standing water > 2 inches  3. No swimming unsupervised  4. No use of automatic or semi-automatic firearms and no using other firearms unsupervised  5. No use of heavy machinery unsupervised  6. No cooking over an open flame on the front burners (back burners OK)  7. For additional recommendations please discuss with neurology or PCP as outpatient.     Patient case discussed with stroke fellow under the supervision of stroke attending.  Final recommendations regarding management to be confirmed upon attending attestation.     ** Case to be seen by neurology attending during AM rounds   63 year old female with a pmh of paroxysmal a fib (diagnosed in 2018, on metoprolol prn) was recently transitioned from eliquis to asa 81mg x2 daily around two weeks ago, presumed R NORMAN stroke likely CE from AF   7/23 with no residual deficits and hypothyroidism who presented to the ED for transient L arm numbness. LKW 1345 patient had sudden onset numbness lasting less than 30 seconds. Denied HA, weakness, dizziness, nv, change in vision or gait. Of note, prior CVA was for R arm (shoulder) numbness with no current residual deficits. Patient was transitioned from eliquis via her Cardiologist.   Her prior R sided numbness was worked up outpatient with EEG that was reportedly negative    CT brain showed a right anterior frontal vasogenic edema and underlying   right anteromedial frontal lesion which was previously seen to avidly   enhance. Underlying hyperostotic bone. Findings suspicious for the   presence of meningioma.    Impression: Transient L arm numbness most likely 2/2 right anterior frontal vasogenic edema and underlying   right anteromedial frontal lesion. Symptoms more likely representative of seizure event less likely stroke.    Recommendations   VEEG to evaluate for abnormal discharges or seizures  NSG consulted, rec starting steroids as per ED. No neurological contraindication for steroids  MRI brain w/wo to further characterize mass   seizure precautions   C/w home aspirin  Q4 neurology checks  Consider TTE  Permissive HTN for now     Lorazepam 2mg IV PRN for seizure activity lasting >3-5mins, >2x/hr, >3x/day, or if GTC , repeat after 1 minute (max dose 0.1 mg/kg)        [] Given concern for seizure, advise the patient with regards to risks and driving privileges associated with the [place] Guidelines. Advise patient regarding the risk of seizures and general seizure safety recommendations including not to be bathing alone, climbing to high places and operating heavy machinery, until cleared by follow-up outpatient Neurology. Reinforce the importance of compliance with medications. Discuss sleep hygiene and the risks of sleep disruption. Discuss the risk of death associated with seizures / SUDEP.     [] Please note: if patient has a convulsion, please document length of episode, specifically what patient was doing paying attention to eye opening vs closure, gaze deviation, shaking of extremities, tongue bite, urinary incontinence, any derangement of vital signs. Generalized motor seizures can have dilated and unreactive pupils, absent oculocephalic reflexes (no dolls eyes), and open eyelids (99% of cases). Head turning from sided to side, pelvic thrusting, bicycling movements, hand waving are NOT manifestations of generalized motor seizures.    Seizure Precautions discussed:  1. No driving for 1 year from date of last seizure as per [place] law  2. No taking a bath alone or showering with standing water > 2 inches  3. No swimming unsupervised  4. No use of automatic or semi-automatic firearms and no using other firearms unsupervised  5. No use of heavy machinery unsupervised  6. No cooking over an open flame on the front burners (back burners OK)  7. For additional recommendations please discuss with neurology or PCP as outpatient.     Patient case discussed with stroke fellow under the supervision of stroke attending.  Final recommendations regarding management to be confirmed upon attending attestation.     ** Case to be seen by neurology attending during AM rounds   63 year old female with a pmh of paroxysmal a fib (diagnosed in 2018, on metoprolol prn) was recently transitioned from eliquis to asa 81mg x2 daily around two weeks ago, presumed R NORMAN stroke likely CE from AF   7/23 with no residual deficits and hypothyroidism who presented to the ED for transient L arm numbness. LKW 1345 patient had sudden onset numbness lasting less than 30 seconds. Denied HA, weakness, dizziness, nv, change in vision or gait. Of note, prior CVA was for R arm (shoulder) numbness with no current residual deficits. Patient was transitioned from eliquis via her Cardiologist.   Her prior R sided numbness was worked up outpatient with EEG that was reportedly negative    CT brain showed a right anterior frontal vasogenic edema and underlying   right anteromedial frontal lesion which was previously seen to avidly   enhance. Underlying hyperostotic bone. Findings suspicious for the   presence of meningioma.    Impression: Transient L arm numbness most likely 2/2 right anterior frontal vasogenic edema and underlying   right anteromedial frontal lesion. Possibly meningioma.  Symptoms more likely representative of seizure event less likely stroke however unable to definitively r/o.      Recommendations   VEEG to evaluate for abnormal discharges or seizures  NSG consulted, rec starting steroids as per ED. No neurological contraindication for steroids  MRI brain w/wo to further characterize mass   seizure precautions   C/w home aspirin  Q4 neurology checks  Consider TTE  Permissive HTN for now     Lorazepam 2mg IV PRN for seizure activity lasting >3-5mins, >2x/hr, >3x/day, or if GTC , repeat after 1 minute (max dose 0.1 mg/kg)        [] Given concern for seizure, advise the patient with regards to risks and driving privileges associated with the [place] Guidelines. Advise patient regarding the risk of seizures and general seizure safety recommendations including not to be bathing alone, climbing to high places and operating heavy machinery, until cleared by follow-up outpatient Neurology. Reinforce the importance of compliance with medications. Discuss sleep hygiene and the risks of sleep disruption. Discuss the risk of death associated with seizures / SUDEP.     [] Please note: if patient has a convulsion, please document length of episode, specifically what patient was doing paying attention to eye opening vs closure, gaze deviation, shaking of extremities, tongue bite, urinary incontinence, any derangement of vital signs. Generalized motor seizures can have dilated and unreactive pupils, absent oculocephalic reflexes (no dolls eyes), and open eyelids (99% of cases). Head turning from sided to side, pelvic thrusting, bicycling movements, hand waving are NOT manifestations of generalized motor seizures.    Seizure Precautions discussed:  1. No driving for 1 year from date of last seizure as per [place] law  2. No taking a bath alone or showering with standing water > 2 inches  3. No swimming unsupervised  4. No use of automatic or semi-automatic firearms and no using other firearms unsupervised  5. No use of heavy machinery unsupervised  6. No cooking over an open flame on the front burners (back burners OK)  7. For additional recommendations please discuss with neurology or PCP as outpatient.     Patient case discussed with stroke fellow under the supervision of stroke attending.  Final recommendations regarding management to be confirmed upon attending attestation.     ** Case to be seen by neurology attending during AM rounds   63 year old female with a pmh of paroxysmal a fib (diagnosed in 2018, on metoprolol prn) was recently transitioned from eliquis to asa 81mg x2 daily around two weeks ago, presumed R NORMAN stroke likely CE from AF   7/23 with no residual deficits and hypothyroidism who presented to the ED for transient L arm numbness. LKW 1345 patient had sudden onset numbness lasting less than 30 seconds. Denied HA, weakness, dizziness, nv, change in vision or gait. Of note, prior CVA was for R arm (shoulder) numbness with no current residual deficits. Patient was transitioned from eliquis via her Cardiologist.   Her prior R sided numbness was worked up outpatient with EEG that was reportedly negative    CT brain showed a right anterior frontal vasogenic edema and underlying   right anteromedial frontal lesion which was previously seen to avidly   enhance. Underlying hyperostotic bone. Findings suspicious for the   presence of meningioma.    Impression: Transient L arm numbness most likely 2/2 right anterior frontal vasogenic edema and underlying   right anteromedial frontal lesion. Possibly meningioma.  Symptoms more likely representative of seizure event less likely stroke however unable to definitively r/o.      Recommendations   VEEG to evaluate for abnormal discharges or seizures  NSG consulted, rec starting steroids as per ED. No neurological contraindication for steroids  MRI brain w/wo to further characterize mass   seizure precautions   C/w home aspirin  Q4 neurology checks  Consider TTE  Permissive HTN for now     Lorazepam 2mg IV PRN for seizure activity lasting >3-5mins, >2x/hr, >3x/day, or if GTC , repeat after 1 minute (max dose 0.1 mg/kg)        [] Given concern for seizure, advise the patient with regards to risks and driving privileges associated with the [place] Guidelines. Advise patient regarding the risk of seizures and general seizure safety recommendations including not to be bathing alone, climbing to high places and operating heavy machinery, until cleared by follow-up outpatient Neurology. Reinforce the importance of compliance with medications. Discuss sleep hygiene and the risks of sleep disruption. Discuss the risk of death associated with seizures / SUDEP.     [] Please note: if patient has a convulsion, please document length of episode, specifically what patient was doing paying attention to eye opening vs closure, gaze deviation, shaking of extremities, tongue bite, urinary incontinence, any derangement of vital signs. Generalized motor seizures can have dilated and unreactive pupils, absent oculocephalic reflexes (no dolls eyes), and open eyelids (99% of cases). Head turning from sided to side, pelvic thrusting, bicycling movements, hand waving are NOT manifestations of generalized motor seizures.    Seizure Precautions discussed:  1. No driving for 1 year from date of last seizure as per [place] law  2. No taking a bath alone or showering with standing water > 2 inches  3. No swimming unsupervised  4. No use of automatic or semi-automatic firearms and no using other firearms unsupervised  5. No use of heavy machinery unsupervised  6. No cooking over an open flame on the front burners (back burners OK)  7. For additional recommendations please discuss with neurology or PCP as outpatient.     Patient case discussed with stroke fellow under the supervision of stroke attending.  Final recommendations regarding management to be confirmed upon attending attestation.     ** Case to be seen by neurology attending during AM rounds     patient signed out AMA prior to attending [place]

## 2023-11-21 NOTE — ED PROVIDER NOTE - CARE PLAN
Principal Discharge DX:	Numbness   1 Principal Discharge DX:	Numbness  Secondary Diagnosis:	Brain TIA  Secondary Diagnosis:	Lesion of frontal lobe of brain

## 2023-11-21 NOTE — ED ADULT NURSE NOTE - AS PAIN REST
Physical Therapy Daily Treatment     Visit Count: 12  Plan of Care Dates: Initial: 4/20/2018 Through: 6/18/2018  Insurance Information: NO AUTH, NO CO-PAY, VISIT LIMIT OF 24 PER CALENDAR YEAR  Next Referring Provider Visit: Not scheduled     Referred by: Drew Dunlap DC  Medical Diagnosis (from order):  Radiculopathy, cervical region [M54.12]  Treatment Diagnosis: Cervical Symptoms with Pain and Radiating Pain  Insurance: 1. [employer]     Date of onset/injury: 7 weeks  Diagnosis Precautions: none  Chart reviewed: Relevant co-morbidities, allergies, tests and medications:      SUBJECTIVE   Patient reports her elbow is still sore.  She is continuing to ice.  She is taking ibuprofen and can tell when it wears off.    OBJECTIVE   Tenderness with palpation to left pectoral muscle, right extensors and flexors of the hand, and right flexors of the hand.    Mechanical Cervical Traction (05136):   Position: hooklying Duration: 12 minutes   Intermittent: 30/15 pounds, 40/10 seconds hold/relax  Results: less numbness in hand; less pain in elbow, more pain in neck and shoulder; no adverse reaction to treatment     Manual Therapy:  Strain counterstrain to left pectoral major, left extensors and flexors of the hand, and right flexors of the hand.      Current Home Program:   1.  Get phone headset  2.  Corner 3 height stretch, less intense  3.  Left scalene towel stretch  4.  Ulnar nerve flossing, 10 repetitions; 2-3 times a day.  5.5.  Wrist flexion and extension stretch with elbow bend 90\", 2-3 times a day.    ASSESSMENT   Decrease in elbow symptoms following strain counterstrain.          PLAN   Continue with plan of care.     THERAPY DAILY BILLING   Primary Insurance:  [employer]  Secondary Insurance:     Evaluation Procedures:  No evaluation codes were used on this date of service    Timed Procedures:  Manual Therapy, 10 minutes    Untimed Procedures:  Mechanical Traction     Bhavani Iniguez  CLEO Beck PT, OCS, CSCS was present and supervised this patient visit. Notes were reviewed and care agreed upon.          0 (no pain/absence of nonverbal indicators of pain)

## 2023-11-21 NOTE — ED ADULT TRIAGE NOTE - CHIEF COMPLAINT QUOTE
pt with hx A fib, c/o of lt arm numbness onset 1 hr ago, pt is awake and responsive, code stroke initiated

## 2023-11-21 NOTE — CONSULT NOTE ADULT - SUBJECTIVE AND OBJECTIVE BOX
HPI: 63 year old female with a pmh of paroxysmal a fib (diagnosed in 2018, on metoprolol prn) was recently transitioned from eliquis to asa 81mg x2 daily around two weeks ago, presumed R NORMAN stroke likely CE from AF   7/23 with no residual deficits and hypothyroidism who presented to the ED for transient L arm numbness. LKW 1345 patient had sudden onset numbness lasting less than 30 seconds. Denied HA, weakness, dizziness, nv, change in vision or gait. Of note, prior CVA was for R arm (shoulder) numbness with no current residual deficits. Patient was transitioned from eliquis via her Cardiologist.   Her prior R sided numbness was worked up outpatient with EEG that was reportedly negative      Nihss 0  mrs 0   Not a tenectaplace candidate d/t low nih       REVIEW OF SYSTEMS  A 10-system ROS was performed and is negative except for those items noted above and/or in the HPI.    PAST MEDICAL & SURGICAL HISTORY:  Hypothyroidism      Chronic atrial fibrillation      H/O myomectomy      S/P tonsillectomy        FAMILY HISTORY:  Family history of CKD (chronic kidney disease)      SOCIAL HISTORY:   T/E/D:   Occupation:   Lives with:     MEDICATIONS (HOME):  Home Medications:  levothyroxine 100 mcg (0.1 mg) oral tablet: 1 orally once a day (20 Jul 2023 14:25)    MEDICATIONS  (STANDING):  ondansetron Injectable 4 milliGRAM(s) IV Push once    MEDICATIONS  (PRN):    ALLERGIES/INTOLERANCES:  Allergies  Soy (Short breath)  penicillin (Unknown)    Intolerances    VITALS & EXAMINATION:  Vital Signs Last 24 Hrs  T(C): 36.7 (21 Nov 2023 14:34), Max: 36.7 (21 Nov 2023 14:34)  T(F): 98 (21 Nov 2023 14:34), Max: 98 (21 Nov 2023 14:34)  HR: 67 (21 Nov 2023 14:34) (67 - 67)  BP: 156/54 (21 Nov 2023 14:34) (156/54 - 156/54)  BP(mean): --  RR: 18 (21 Nov 2023 14:34) (18 - 18)  SpO2: 98% (21 Nov 2023 14:34) (98% - 98%)    Parameters below as of 21 Nov 2023 14:34  Patient On (Oxygen Delivery Method): room air        General:  Constitutional: Female, appears stated age, in no apparent distress including pain  Head: Normocephalic & Atraumatic.  Respiratory: Breathing comfortably.    Neurological:  MS: Awake, alert, oriented to person, place, situation, time. Follows all commands.    Language: Speech is clear, fluent with good repetition & comprehension.    CNs: PERRL (R = 3mm, L = 3mm). VFF. EOMI no nystagmus. V1-3 intact to LT b/l. No facial asymmetry b/l, full eye closure strength b/l. Hearing grossly normal (rubbing fingers) b/l. Tongue midline, normal movements, no atrophy.     Motor: Normal muscle bulk & tone. No noticeable tremor. No pronator drift.              Deltoid	Biceps	Triceps	   R	         5	             5	              5	 5	  		 	  L	         5	             5	              5	 5	  		    	H-Flex	K-Flex	K-Ext	D-Flex	P-Flex  R	    5	            5	           5	            5	           5		   L	    5	            5	           5	            5	           5		     Sensation: Intact to LT b/l throughout.     Cortical: Extinction on DSS (neglect): none    Reflexes:              Biceps(C5)       BR(C6)     Triceps(C7)               Patellar(L4)    Achilles(S1)    Plantar Resp  R	              2	          2	             2		                              2		    2		      Down   L	              2	          2	             2		                              2		    2		      Down     Coordination: No dysmetria to FTN b/l.     Gait: Deferred.       LABORATORY:  CBC   Chem       LFTs   Coagulopathy   Lipid Panel   A1c   Cardiac enzymes     U/A   CSF  Immunological  Other    STUDIES & IMAGING:  Studies (EKG, EEG, EMG, etc):     Radiology (XR, CT, MR, U/S, TTE/JOSUE): HPI: 63 year old female with a pmh of paroxysmal a fib (diagnosed in 2018, on metoprolol prn) was recently transitioned from eliquis to asa 81mg x2 daily around two weeks ago, presumed R NORMAN stroke likely CE from AF   7/23 with no residual deficits and hypothyroidism who presented to the ED for transient L arm numbness. LKW 1345 patient had sudden onset numbness lasting less than 30 seconds. Denied HA, weakness, dizziness, nv, change in vision or gait. Of note, prior CVA was for R arm (shoulder) numbness with no current residual deficits. Patient was transitioned from eliquis via her Cardiologist.   Her prior R sided numbness was worked up outpatient with EEG that was reportedly negative     While in the ED bp 150s.     CT brain showed a right anterior frontal vasogenic edema and underlying   right anteromedial frontal lesion which was previously seen to avidly   enhance. Underlying hyperostotic bone. Findings suspicious for the   presence of meningioma.     Nihss 0  mrs 0   Not a tenectaplace candidate d/t low nih       REVIEW OF SYSTEMS  A 10-system ROS was performed and is negative except for those items noted above and/or in the HPI.    PAST MEDICAL & SURGICAL HISTORY:  Hypothyroidism      Chronic atrial fibrillation      H/O myomectomy      S/P tonsillectomy        FAMILY HISTORY:  Family history of CKD (chronic kidney disease)      SOCIAL HISTORY:   T/E/D:   Occupation:   Lives with:     MEDICATIONS (HOME):  Home Medications:  levothyroxine 100 mcg (0.1 mg) oral tablet: 1 orally once a day (20 Jul 2023 14:25)    MEDICATIONS  (STANDING):  ondansetron Injectable 4 milliGRAM(s) IV Push once    MEDICATIONS  (PRN):    ALLERGIES/INTOLERANCES:  Allergies  Soy (Short breath)  penicillin (Unknown)    Intolerances    VITALS & EXAMINATION:  Vital Signs Last 24 Hrs  T(C): 36.7 (21 Nov 2023 14:34), Max: 36.7 (21 Nov 2023 14:34)  T(F): 98 (21 Nov 2023 14:34), Max: 98 (21 Nov 2023 14:34)  HR: 67 (21 Nov 2023 14:34) (67 - 67)  BP: 156/54 (21 Nov 2023 14:34) (156/54 - 156/54)  BP(mean): --  RR: 18 (21 Nov 2023 14:34) (18 - 18)  SpO2: 98% (21 Nov 2023 14:34) (98% - 98%)    Parameters below as of 21 Nov 2023 14:34  Patient On (Oxygen Delivery Method): room air        General:  Constitutional: Female, appears stated age, in no apparent distress including pain  Head: Normocephalic & Atraumatic.  Respiratory: Breathing comfortably.    Neurological:  MS: Awake, alert, oriented to person, place, situation, time. Follows all commands.    Language: Speech is clear, fluent with good repetition & comprehension.    CNs: PERRL (R = 3mm, L = 3mm). VFF. EOMI no nystagmus. V1-3 intact to LT b/l. No facial asymmetry b/l, full eye closure strength b/l. Hearing grossly normal (rubbing fingers) b/l. Tongue midline, normal movements, no atrophy.     Motor: Normal muscle bulk & tone. No noticeable tremor. No pronator drift.              Deltoid	Biceps	Triceps	   R	         5	             5	              5	 5	  		 	  L	         5	             5	              5	 5	  		    	H-Flex	K-Flex	K-Ext	D-Flex	P-Flex  R	    5	            5	           5	            5	           5		   L	    5	            5	           5	            5	           5		     Sensation: Intact to LT b/l throughout.     Cortical: Extinction on DSS (neglect): none    Reflexes:              Biceps(C5)       BR(C6)     Triceps(C7)               Patellar(L4)    Achilles(S1)    Plantar Resp  R	              2	          2	             2		                              2		    2		      Down   L	              2	          2	             2		                              2		    2		      Down     Coordination: No dysmetria to FTN b/l.     Gait: Deferred.       LABORATORY:  CBC   Chem       LFTs   Coagulopathy   Lipid Panel   A1c   Cardiac enzymes     U/A   CSF  Immunological  Other    STUDIES & IMAGING:  Studies (EKG, EEG, EMG, etc):     Radiology (XR, CT, MR, U/S, TTE/JOSUE):    < from: CT Angio Brain Stroke Protocol  w/ IV Cont (11.21.23 @ 15:04) >    IMPRESSION:    CT brain:  Similar-appearing right anterior frontal vasogenic edema and underlying   right anteromedial frontal lesion which was previously seen to avidly   enhance. Underlying hyperostotic bone. Findings suspicious for the   presence of meningioma.    No hydrocephalus. No acute intracranial hemorrhage.    CTA brain:  No flow-limiting stenosis or vascular aneurysm. No AVM.    Venous system is fairly well opacified, noevidence for venous sinus or   cortical vein thrombosis.    CTA neck:  No flow-limiting stenosis, evidence for arterial dissection, or vascular   aneurysm.    < end of copied text >

## 2023-11-21 NOTE — ED PROVIDER NOTE - NSFOLLOWUPINSTRUCTIONS_ED_ALL_ED_FT
Please follow up with neurology and neurosurgery.     Please call Dr. Huston's office in the morning.      Please discuss your findings of your tests with Dr. Huston for follow up MRI brain with and without contrast and CT abdomen and pelvis with oral and IV contrast as per neurosurgery recommendations.    Please take decadron 4mg every 6 hours.    -- Please follow-up with your doctor(s) within the next 3 days, but see medical sooner if your symptoms persist or worsen.  Please call tomorrow for an appointment.  If you cannot follow-up with your primary doctor please return to the ED for any urgent issues.  -- You were given a copy of your labs and imaging.  Please go-over these with your doctor(s).   -- If you have any worsening of symptoms or any other concerns please see your doctor or return to the ED immediately.  -- Please continue taking your home medications as directed.  Do not use alcohol when taking any medication (especially antibiotics, tylenol or other pain medication) unless you check with the doctor or pharmacist.

## 2023-11-21 NOTE — ED PROVIDER NOTE - PHYSICAL EXAMINATION
GEN - NAD;   well appearing;   A+O x3   HEAD - NC/AT     ENT - PEERL, EOMI, mucous membranes    moist , no discharge      NECK: Neck supple, non-tender without lymphadenopathy, no masses, no JVD  PULM - CTA b/l,  symmetric breath sounds  COR -  normal heart sounds    ABD - , ND, NT, soft,  BACK - no CVA tenderness, nontender spine     EXTREMS - no edema, no deformity, warm and well perfused    SKIN - no rash    or bruising      NEUROLOGIC - alert, face symmetric, speech fluent, sensation nl, motor no focal deficit.

## 2023-11-21 NOTE — ED PROVIDER NOTE - ATTENDING CONTRIBUTION TO CARE
63F h/o afib, previously on eliquis, changed to ASA.  took ASA this morning.  had epistaxis few days ago.  L arm got numb x 2 1 hour ago.  Previously had a stroke jul 20 of this year.  prevously had R side numbness.  ROS otherwise fatigue reports.  H/o afib, hypothyroid, stroke.  PSHX myomectomy, T & A.  No T.  All - pcn.  Stroke code called.  Normal exam here.  Called by rads pt has R frontal lesion with vasogenic edema now thought to possibly represent a mass possibly meningioma, previously thought to be an old stroke.  Nsx called, advised MRI and further workup.  Pt declining MRI reporting severe claustrophobia, had MRI last time in open MRI.  Pt may not want to stay for further eval.    VS:  unremarkable except mild HTN    GEN - NAD;   well appearing;   A+O x3   HEAD - NC/AT     ENT - PEERL, EOMI, mucous membranes    moist , no discharge      NECK: Neck supple, non-tender without lymphadenopathy, no masses, no JVD  PULM - CTA b/l,  symmetric breath sounds  COR -  normal heart sounds    ABD - , ND, NT, soft,  BACK - no CVA tenderness, nontender spine     EXTREMS - no edema, no deformity, warm and well perfused    SKIN - no rash    or bruising      NEUROLOGIC - alert, face symmetric, speech fluent, sensation nl, motor no focal deficit.    Upon my evaluation, this patient had a high probability of imminent or life-threatening deterioration due to TIA, brain mass which required my direct attention, intervention, and personal management.  The patient has a  medical condition that impairs one or more vital organ systems.  Frequent personal assessment and adjustment of medical interventions was performed.      I have personally provided 40 minutes of critical care time exclusive of time spent on separately billable procedures. Time includes review of laboratory data, radiology results, discussion with consultants, patient and family; monitoring for potential decompensation, as well as time spent retrieving data and reviewing the chart and documenting the visit. Interventions were performed as documented above.

## 2023-11-21 NOTE — ED PROVIDER NOTE - CARE PROVIDER_API CALL
Yosvany Khan  Cardiology  [address], [address]  [place] [address]  Phone: [phone]  Fax: [phone]  Follow Up Time:     Dalton Catalan  Neurology  [address], [address]  [place] [address]  Phone: [phone]  Fax: [phone]  Follow Up Time:     Abigail Stoeks  Neurosurgery  [address]  [place] [address]  Phone: [phone]  Fax: [phone]  Follow Up Time:

## 2023-11-21 NOTE — CONSULT NOTE ADULT - PROBLEM SELECTOR RECOMMENDATION 9
-MRI brain w/ and w/ out contrast  -CTAP w/ w/out IV contrast to assess for primary lesion  - Decadron 4q6h    Case to be discussed with attending Dr. Stokes

## 2023-12-14 ENCOUNTER — NON-APPOINTMENT (OUTPATIENT)
Age: 63
End: 2023-12-14

## 2023-12-14 ENCOUNTER — APPOINTMENT (OUTPATIENT)
Dept: CARDIOLOGY | Facility: CLINIC | Age: 63
End: 2023-12-14
Payer: COMMERCIAL

## 2023-12-14 VITALS
BODY MASS INDEX: 31.89 KG/M2 | OXYGEN SATURATION: 99 % | SYSTOLIC BLOOD PRESSURE: 153 MMHG | DIASTOLIC BLOOD PRESSURE: 85 MMHG | WEIGHT: 180 LBS | HEART RATE: 63 BPM

## 2023-12-14 DIAGNOSIS — E53.8 DEFICIENCY OF OTHER SPECIFIED B GROUP VITAMINS: ICD-10-CM

## 2023-12-14 DIAGNOSIS — E66.9 OBESITY, UNSPECIFIED: ICD-10-CM

## 2023-12-14 DIAGNOSIS — Z80.51 FAMILY HISTORY OF MALIGNANT NEOPLASM OF KIDNEY: ICD-10-CM

## 2023-12-14 DIAGNOSIS — Z80.0 FAMILY HISTORY OF MALIGNANT NEOPLASM OF DIGESTIVE ORGANS: ICD-10-CM

## 2023-12-14 DIAGNOSIS — Z78.9 OTHER SPECIFIED HEALTH STATUS: ICD-10-CM

## 2023-12-14 PROCEDURE — 99215 OFFICE O/P EST HI 40 MIN: CPT | Mod: 25

## 2023-12-14 PROCEDURE — 93000 ELECTROCARDIOGRAM COMPLETE: CPT

## 2023-12-14 RX ORDER — LEVOTHYROXINE SODIUM 100 UG/1
100 TABLET ORAL
Qty: 90 | Refills: 2 | Status: ACTIVE | COMMUNITY
Start: 2023-12-14 | End: 1900-01-01

## 2023-12-14 NOTE — CARDIOLOGY SUMMARY
[de-identified] : 9/7/2023. Sinus rhythm at 62 BPM. LAE. Negative precordial T-waves V1-V2. Poor R-wav progression.  [de-identified] : PROCEDURE: Transthoracic echocardiogram with 2-D, M-Mode and complete spectral and color flow Doppler. Patient was injected with 10 cc's of aerosolized saline. Patient was injected with 10 cc's of aerosolized saline. INDICATION: Cerebral infarction, unspecified (I63.9) ------------------------------------------------------------------------ DIMENSIONS: Dimensions:     Normal Values: LA:     4.4 cm    2.0 - 4.0 cm Ao:     2.5 cm    2.0 - 3.8 cm SEPTUM: 1.0 cm    0.6 - 1.2 cm PWT:    1.0 cm    0.6 - 1.1 cm LVIDd:  4.7 cm    3.0 - 5.6 cm LVIDs:  3.0 cm    1.8 - 4.0 cm Derived Variables: LVMI: 89 g/m2 RWT: 0.42 Fractional short: 36 % Ejection Fraction (Modified Comer Rule): 68 % ------------------------------------------------------------------------ OBSERVATIONS: Mitral Valve: Normal mitral valve. No mitral regurgitation seen. Aortic Root: Normal aortic root. Aortic Valve: Normal trileaflet aortic valve. No aortic valve regurgitation seen. Left Atrium: Normal left atrium.  LA volume index = 24 cc/m2. Left Ventricle: Endocardium not well visualized; grossly normal left ventricular systolic function. LVEF calculated using biplane Comer's method is 68%.  Normal left ventricular internal dimensions and wall thicknesses. Normal left ventricular diastolic function. Right Heart: Normal right atrium. Normal right ventricular size and function. Normal tricuspid valve. No tricuspid regurgitation. Normal pulmonic valve. Mild pulmonic regurgitation. Pericardium/PleuraNo pericardial effusion seen. Hemodynamic: Agitated saline injection demonstrates no evidence of a patent foramen ovale. ------------------------------------------------------------------------ CONCLUSIONS: 1. Normal left atrium.  LA volume index = 24 cc/m2. 2. Normal left ventricular internal dimensions and wall thicknesses. 3. Endocardium not well visualized; grossly normal left ventricular systolic function. LVEF calculated using biplane Comer's method is 68%.  4. Normal left ventricular diastolic function. 5. Normal right atrium. 6. Normal right ventricular size and function. 7. Normal tricuspid valve. No tricuspid regurgitation. 8. Agitated saline injection demonstrates no evidence of a patent foramen ovale. 9. No pericardial effusion seen. *** No previous Echo exam. ------------------------------------------------------------------------ Confirmed on  7/20/2023 - 11:04:53 by Perlita Fountain M.D.

## 2023-12-14 NOTE — DISCUSSION/SUMMARY
[EKG obtained to assist in diagnosis and management of assessed problem(s)] : EKG obtained to assist in diagnosis and management of assessed problem(s) [FreeTextEntry1] : Whitney Lewis is a 63 year old with paroxsymal atrial fibrillation and stroke. Now with evidence of benign brain tumor (and not a prior CVA).  CHADSVASc = 1 (gender). Was previously thought to be 3 because of possible CVA.  Recommend escalating to high intensity statin therapy.  Referral provided to EP to discuss possible left atrial appendage closure in patient with PAF and contraindication to anticoagulation due to the brain tumor. Follow-up with vascular neurology as scheduled.  Follow up in 3 months.

## 2023-12-14 NOTE — REASON FOR VISIT
[Arrhythmia/ECG Abnorrmalities] : arrhythmia/ECG abnormalities [FreeTextEntry1] : December 2023 - Patient returns today for follow-up. She was hospitalized with left arm numbness that was determined to be secondary to a benign brain tumor. She was discharged without anticoagulation.  Neurologic follow-up with Solo Strickland MD.

## 2023-12-14 NOTE — HISTORY OF PRESENT ILLNESS
[FreeTextEntry1] : Whitney Lewis presents today to establish care. Her significant other, Angelo Whiteside, is a patient of this office.  She is a 63 year old with a history of hypothyroidism, paroxysmal atrial fibrillation and recent right anterior cerebral artery stroke who is feeling generally well.   Her atrial fibrillation was first diagnosed in 2018 after she wore a monitor for one month. She reports that she only has atrial fibrillation about once a year, usually in the summer months or when she is under severe stress. When she is in atrial fibrillation she feels as if there are "dolphins flipping in her chest".  She was on Eliquis in the past but did not tolerate it. She also tried Xarelto, but had an unpleasant reaction with meningitis-type symptoms. Following her stroke she was rechallenged with Eliquis 5 mg twice daily, but has only been taking in once daily in the morning. Occasionally she uses metoprolol as needed, but feels unwell when her heart rate goes too low.   Her father is . He passed of renal cancer in his 60's. Her mother is . She had dementia, pancreatic cancer. She had a sister and 2 brothers, one brother is alive. Her sister had liver failure 2/2 opioids. Her brother had cancer/melanoma.  She is working as an .   For exercise she rides a stationary bike (worried about triggering AF on a treadmill) and is working on building up her endurance. She describes no chest discomfort, shortness of breath, palpitations, lightheadedness or syncope.  She has been evaluated for obstructive sleep apnea. She was told she had a mild case, but she believes it was a problem with the test.

## 2024-01-07 PROBLEM — D33.2 BENIGN TUMOR OF BRAIN: Status: ACTIVE | Noted: 2023-12-14

## 2024-01-07 PROBLEM — I48.0 PAROXYSMAL ATRIAL FIBRILLATION: Status: ACTIVE | Noted: 2023-12-14

## 2024-01-11 ENCOUNTER — NON-APPOINTMENT (OUTPATIENT)
Age: 64
End: 2024-01-11

## 2024-01-11 ENCOUNTER — APPOINTMENT (OUTPATIENT)
Dept: ELECTROPHYSIOLOGY | Facility: CLINIC | Age: 64
End: 2024-01-11
Payer: COMMERCIAL

## 2024-01-11 VITALS
HEIGHT: 63 IN | HEART RATE: 63 BPM | DIASTOLIC BLOOD PRESSURE: 88 MMHG | SYSTOLIC BLOOD PRESSURE: 158 MMHG | BODY MASS INDEX: 31.89 KG/M2 | WEIGHT: 180 LBS | OXYGEN SATURATION: 99 %

## 2024-01-11 DIAGNOSIS — I48.0 PAROXYSMAL ATRIAL FIBRILLATION: ICD-10-CM

## 2024-01-11 DIAGNOSIS — D33.2 BENIGN NEOPLASM OF BRAIN, UNSPECIFIED: ICD-10-CM

## 2024-01-11 PROCEDURE — 99205 OFFICE O/P NEW HI 60 MIN: CPT | Mod: 25

## 2024-01-11 PROCEDURE — 93000 ELECTROCARDIOGRAM COMPLETE: CPT

## 2024-01-11 NOTE — REASON FOR VISIT
[FreeTextEntry3] : Dr. Yosvany Khan [FreeTextEntry1] : Neurologist: Dr. Solo Arellano [phone]  Prior Neurologist: Dr. Dalton Catalan [phone]

## 2024-01-11 NOTE — CARDIOLOGY SUMMARY
[de-identified] : ECG from 1/11/2024: Sinus rhythm at 62bpm, low voltage in precordial leads [de-identified] : TTE from 7/20/2023: LA: 4.4, AGNIESZKA: 24, EF: 68%, no MR, normal RA, normal RV size and function, no TR, mild PI, no pericardial effusion, no PFO [de-identified] : CT Angio Brain from 11/21/2023: Similar-appearing right anterior frontal vasogenic edema and underlying  right anteromedial frontal lesion which was previously seen to avidly enhance. Underlying hyperostotic bone. Findings suspicious for the presence of meningioma.

## 2024-01-11 NOTE — PHYSICAL EXAM
[Well Developed] : well developed [Well Nourished] : well nourished [No Acute Distress] : no acute distress [Normal Venous Pressure] : normal venous pressure [Normal S1, S2] : normal S1, S2 [No Murmur] : no murmur [No Rub] : no rub [No Gallop] : no gallop [Clear Lung Fields] : clear lung fields [Good Air Entry] : good air entry [No Respiratory Distress] : no respiratory distress  [Soft] : abdomen soft [Non Tender] : non-tender [Normal Gait] : normal gait [No Edema] : no edema [No Rash] : no rash [Moves all extremities] : moves all extremities [Normal Speech] : normal speech [Alert and Oriented] : alert and oriented [Normal memory] : normal memory

## 2024-01-11 NOTE — DISCUSSION/SUMMARY
[FreeTextEntry1] : Whitney Lewis is a 63-year-old woman with hypothyroidism, a prior suspected right anterior cerebral artery stroke in July 2023, a likely right anterior frontal meningioma and paroxysmal atrial fibrillation. She presents today for an initial evaluation.  We discussed the mechanism by which atrial fibrillation can lead to stroke. She reported to me that her Neurologist's impression is that she never had a stroke. When I spoke with her Neurologist on the phone, he indicated to me that he could not rule out that she had a TIA in July and therefore we should consider this in her CHADS2-VASC score. As her CHADS2-VASC score is three, she would benefit from oral anticoagulation. In speaking with the patient, she reports that she will not resume Eliquis (due to hair loss) or Xarelto (due to an allergic reaction). Her Neurologist was no opposed to resumption of oral anticoagulation from a safety perspective. I spoke with the patient about the possibility of a left atrial appendage occlusion (LAAO) device implantation and the labeling for a Watchman device for 6 months of dual antiplatelet therapy following the implant. I also spoke to her Neurologist about this and the need for intravenous Heparin during the implant procedure. He said there was no opposition from his end to proceeding with a LAAO device implantation.  We also reviewed options for rhythm control including a "pill in the pocket" approach in the event she has a recurrence. The patient will call the office if/when she has a recurrence of atrial fibrillation or would like to move forward with a LAAO device implantation procedure. [EKG obtained to assist in diagnosis and management of assessed problem(s)] : EKG obtained to assist in diagnosis and management of assessed problem(s)

## 2024-01-11 NOTE — HISTORY OF PRESENT ILLNESS
[FreeTextEntry1] : Whitney Lewis is a 63-year-old woman with hypothyroidism, a prior suspected right anterior cerebral artery stroke in July 2023, a likely right anterior frontal meningioma and paroxysmal atrial fibrillation. She presents today for an initial evaluation.  Her atrial fibrillation was initially diagnosed in 2018. She believes her triggers for atrial fibrillations include "severe stress." She still has episodes 1-2 times per year. These episodes last for 1-2 days however her most recent episode lasted for 10 days. Her symptoms are mainly palpitations. Her stroke was diagnosed in July of 2023 when she presented with transient right facial and shoulder numbness. A CT from that presentation demonstrated a right frontal hyperdensity that was felt to be compatible with an enhancing lesion. She declined an MRI at that time. She presented again in November of 2023 with left hand and arm numbness. A CT from that admission revealed the likely right anterior frontal meningioma. She has now undergone an outpatient MRI. The results are not available for my review. The patient reports that the MRI confirmed that the abnormality was a meningioma. She reports that her Neurologist believes that she never had a stroke, and her symptoms were due to her meningioma.   She works as an  and denies toxic habits. She reports having had a prior home sleep study years ago that was inconclusive.  CHADS2-VASC: 3 (F: 1, CVA: 2) - provided that her first event was a TIA/CVA

## 2024-09-30 NOTE — OCCUPATIONAL THERAPY INITIAL EVALUATION ADULT - NSOTDISCHREC_GEN_A_CORE
Home with no OT needs. Patient appears to be at baseline for ADLs and functional mobility. Patient will not be placed on OT program. Please reconsult this discipline with any changes.
[id number]

## 2024-11-04 ENCOUNTER — RX RENEWAL (OUTPATIENT)
Age: 64
End: 2024-11-04